# Patient Record
Sex: FEMALE | Race: ASIAN | Employment: OTHER | URBAN - METROPOLITAN AREA
[De-identification: names, ages, dates, MRNs, and addresses within clinical notes are randomized per-mention and may not be internally consistent; named-entity substitution may affect disease eponyms.]

---

## 2017-06-30 ENCOUNTER — OFFICE VISIT (OUTPATIENT)
Dept: CARDIOLOGY CLINIC | Age: 67
End: 2017-06-30

## 2017-06-30 VITALS
SYSTOLIC BLOOD PRESSURE: 132 MMHG | WEIGHT: 151 LBS | DIASTOLIC BLOOD PRESSURE: 72 MMHG | HEART RATE: 75 BPM | HEIGHT: 65 IN | BODY MASS INDEX: 25.16 KG/M2

## 2017-06-30 DIAGNOSIS — I10 ESSENTIAL HYPERTENSION, BENIGN: Primary | ICD-10-CM

## 2017-06-30 DIAGNOSIS — R60.9 EDEMA, UNSPECIFIED TYPE: ICD-10-CM

## 2017-06-30 DIAGNOSIS — E78.00 PURE HYPERCHOLESTEROLEMIA: ICD-10-CM

## 2017-06-30 DIAGNOSIS — I10 ESSENTIAL HYPERTENSION: Chronic | ICD-10-CM

## 2017-06-30 NOTE — LETTER
Mary Salinas 1950 
 
6/30/2017 Dear Daniela Parr MD 
 
I had the pleasure of evaluating  Ms. Nara Kerns in office today. Below are the relevant portions of my assessment and plan of care. ICD-10-CM ICD-9-CM 1. Essential hypertension, benign I10 401.1 AMB POC EKG ROUTINE W/ 12 LEADS, INTER & REP  
   METABOLIC PANEL, BASIC  
   CBC W/O DIFF 2. Essential hypertension I10 401.9 3. Pure hypercholesterolemia E78.00 272.0 LIPID PANEL  
   HEPATIC FUNCTION PANEL  
 5/16 noncompliance; d/adama due to fear of DM 4. Edema, unspecified type R60.9 782.3 DUPLEX LOWER EXT VENOUS BILAT Current Outpatient Prescriptions Medication Sig Dispense Refill  valsartan (DIOVAN) 160 mg tablet Take 1 Tab by mouth every evening. Indications: Hypertension 90 Tab 1  carvedilol (COREG) 6.25 mg tablet TAKE ONE TABLET BY MOUTH TWICE DAILY WITH  MEALS 180 Tab 6  
 omeprazole (PRILOSEC) 20 mg capsule Take 20 mg by mouth two (2) times a day. Indications: GASTROESOPHAGEAL REFLUX  cholecalciferol, VITAMIN D3, (VITAMIN D3) 5,000 unit tab tablet Take 5,000 Units by mouth daily.  atorvastatin (LIPITOR) 10 mg tablet Take 1 Tab by mouth daily. 90 Tab 3  
 amLODIPine (NORVASC) 5 mg tablet Take 1 Tab by mouth daily. (Patient taking differently: Take 5 mg by mouth daily. Indications: HYPERTENSION) 90 Tab 1  
 nitroglycerin (NITROSTAT) 0.4 mg SL tablet 1 Tab by SubLINGual route every five (5) minutes as needed for Chest Pain for up to 3 doses. 25 Tab 0  
 aspirin 81 mg tablet Take 81 mg by mouth daily. Orders Placed This Encounter  DUPLEX LOWER EXT VENOUS BILAT Standing Status:   Future Standing Expiration Date:   7/30/2018  METABOLIC PANEL, BASIC Standing Status:   Future Standing Expiration Date:   9/30/2017  LIPID PANEL Standing Status:   Future Standing Expiration Date:   9/30/2017  
 HEPATIC FUNCTION PANEL Standing Status:   Future Standing Expiration Date:   9/30/2017  CBC W/O DIFF Standing Status:   Future Standing Expiration Date:   9/30/2017  AMB POC EKG ROUTINE W/ 12 LEADS, INTER & REP Order Specific Question:   Reason for Exam: Answer:   follow up If you have questions, please do not hesitate to call me. I look forward to following Ms. Patricia Mckenzie along with you. Sincerely, Sushma Xavier MD

## 2017-06-30 NOTE — PROGRESS NOTES
HISTORY OF PRESENT ILLNESS  Bob Kelly is a 61 y.o. female. HPI Comments: 6/17 edema left leg for 2 months after [place] trip    Cholesterol Problem   The history is provided by the medical records. This is a chronic problem. Associated symptoms include shortness of breath. Pertinent negatives include no chest pain and no headaches. Hypertension   The history is provided by the medical records and patient. This is a chronic problem. The problem has been gradually improving. Associated symptoms include shortness of breath. Pertinent negatives include no chest pain and no headaches. Shortness of Breath   The history is provided by the patient. This is a new problem. The problem occurs intermittently. The current episode started more than 1 week ago. Associated symptoms include leg swelling. Pertinent negatives include no fever, no headaches, no cough, no wheezing, no PND, no orthopnea, no chest pain, no vomiting, no rash and no claudication. The problem's precipitants include exercise (1 flight). Leg Swelling   The history is provided by the patient. This is a new problem. The current episode started more than 1 week ago (4/17). The problem occurs daily. The problem has been gradually worsening. Associated symptoms include shortness of breath. Pertinent negatives include no chest pain and no headaches. The symptoms are aggravated by standing. The symptoms are relieved by sleep (not much change). Review of Systems   Constitutional: Negative for chills, fever, malaise/fatigue and weight loss. HENT: Negative for nosebleeds. Eyes: Negative for discharge. Respiratory: Positive for shortness of breath. Negative for cough and wheezing. Cardiovascular: Positive for leg swelling. Negative for chest pain, palpitations, orthopnea, claudication and PND. Gastrointestinal: Negative for diarrhea, nausea and vomiting. Genitourinary: Negative for dysuria and hematuria.    Musculoskeletal: Negative for joint pain.   Skin: Negative for rash. Neurological: Negative for dizziness, seizures, loss of consciousness and headaches. Endo/Heme/Allergies: Negative for polydipsia. Does not bruise/bleed easily. Psychiatric/Behavioral: Negative for depression and substance abuse. The patient does not have insomnia. Allergies   Allergen Reactions    Phenergan [Promethazine] Other (comments)     Lethargy, slurred speech, hallucinations. Past Medical History:   Diagnosis Date    Arthritis     Chronic pain     right knee    Coronary atherosclerosis of native coronary artery 3/31/2015    possible cad abnormal stress test and ant t inversion in past-2007 asymptomatic     Essential hypertension, benign     GERD (gastroesophageal reflux disease)     Nausea & vomiting     Nonspecific abnormal electrocardiogram (ECG) (EKG) 3/31/2015    ant t inversion old     Other and unspecified hyperlipidemia     Transfusion history     No history of receiving blood or blood product transfusion(s). Family History   Problem Relation Age of Onset    Heart Attack Father      cause of death was MI       Social History   Substance Use Topics    Smoking status: Never Smoker    Smokeless tobacco: Never Used    Alcohol use No      Comment: does not give any significant history of alcohol usage        Current Outpatient Prescriptions   Medication Sig    valsartan (DIOVAN) 160 mg tablet Take 1 Tab by mouth every evening. Indications: Hypertension    carvedilol (COREG) 6.25 mg tablet TAKE ONE TABLET BY MOUTH TWICE DAILY WITH  MEALS    omeprazole (PRILOSEC) 20 mg capsule Take 20 mg by mouth two (2) times a day. Indications: GASTROESOPHAGEAL REFLUX    cholecalciferol, VITAMIN D3, (VITAMIN D3) 5,000 unit tab tablet Take 5,000 Units by mouth daily.  atorvastatin (LIPITOR) 10 mg tablet Take 1 Tab by mouth daily.  amLODIPine (NORVASC) 5 mg tablet Take 1 Tab by mouth daily. (Patient taking differently: Take 5 mg by mouth daily. Indications: HYPERTENSION)    nitroglycerin (NITROSTAT) 0.4 mg SL tablet 1 Tab by SubLINGual route every five (5) minutes as needed for Chest Pain for up to 3 doses.  aspirin 81 mg tablet Take 81 mg by mouth daily. No current facility-administered medications for this visit. Past Surgical History:   Procedure Laterality Date    HX GYN      polyp removal       Diagnostic Studies:  CARDIOLOGY STUDIES 6/13/2016 5/9/2016 3/22/2007   EKG Result - SR, NSTT a/s leads -   Exercise Nuclear Stress Test Result - - ant apical ischemia, nl ef   Pharmacological Nuclear Stress Test Result +CP, no sig EKG changes, nl scan, nl EF - -       Visit Vitals    /72    Pulse 75    Ht 5' 5\" (1.651 m)    Wt 68.5 kg (151 lb)    BMI 25.13 kg/m2       Ms. Angel Rincon has a reminder for a \"due or due soon\" health maintenance. I have asked that she contact her primary care provider for follow-up on this health maintenance. Physical Exam   Constitutional: She is oriented to person, place, and time. She appears well-developed and well-nourished. No distress. HENT:   Head: Normocephalic and atraumatic. Mouth/Throat: Normal dentition. Eyes: Right eye exhibits no discharge. Left eye exhibits no discharge. No scleral icterus. Neck: Neck supple. No JVD present. Carotid bruit is not present. No thyromegaly present. Cardiovascular: Normal rate, regular rhythm, S1 normal, S2 normal, normal heart sounds and intact distal pulses. Exam reveals no gallop and no friction rub. No murmur heard. Pulmonary/Chest: Effort normal and breath sounds normal. She has no wheezes. She has no rales. Abdominal: Soft. She exhibits no mass. There is no tenderness. Musculoskeletal: She exhibits edema (2+). Lymphadenopathy:        Right cervical: No superficial cervical adenopathy present. Left cervical: No superficial cervical adenopathy present. Neurological: She is alert and oriented to person, place, and time.    Skin: Skin is warm and dry. No rash noted. Psychiatric: She has a normal mood and affect. Her behavior is normal.       ASSESSMENT and Helen Woodall was seen today for cholesterol problem. Diagnoses and all orders for this visit:    Essential hypertension, benign  -     AMB POC EKG ROUTINE W/ 12 LEADS, INTER & REP  -     METABOLIC PANEL, BASIC; Future  -     CBC W/O DIFF; Future    Essential hypertension    Pure hypercholesterolemia  Comments:  5/16 noncompliance; d/adama due to fear of DM  Orders:  -     LIPID PANEL; Future  -     HEPATIC FUNCTION PANEL; Future    Edema, unspecified type  -     DUPLEX LOWER EXT VENOUS BILAT; Future        Pertinent laboratory and test data reviewed and discussed with patient.   See patient instructions also for other medical advice given    Medications Discontinued During This Encounter   Medication Reason    ondansetron (ZOFRAN ODT) 8 mg disintegrating tablet Therapy Completed    oxyCODONE IR (ROXICODONE) 5 mg immediate release tablet Therapy Completed    enoxaparin (LOVENOX) 40 mg/0.4 mL Therapy Completed       Follow-up Disposition:  Return in about 2 weeks (around 7/14/2017), or if symptoms worsen or fail to improve, for post test.

## 2017-06-30 NOTE — PROGRESS NOTES
1. Have you been to the ER, urgent care clinic since your last visit? Hospitalized since your last visit? Yes When: 9/2017[facility] Reason for visit: knee replacement    2. Have you seen or consulted any other health care providers outside of the [facility] since your last visit? Include any pap smears or colon screening. No     3. Since your last visit, have you had any of the following symptoms? shortness of breath. 4.  Have you had any blood work, X-rays or cardiac testing?no      No       5. Where do you normally have your labs drawn? 6. Do you need any refills today?    yes

## 2017-06-30 NOTE — MR AVS SNAPSHOT
Visit Information Date & Time Provider Department Dept. Phone Encounter #  
 6/30/2017  3:00 PM Ronnie Kaur MD [facility] [phone] Follow-up Instructions Return in about 2 weeks (around 7/14/2017), or if symptoms worsen or fail to improve, for post test.  
  
Your Appointments 7/10/2017 10:00 AM  
ESTABLISHED PATIENT with Ronnie Kaur MD  
[facility] ([facility]) Appt Note: 2 week post Venous Doppler/[address] Counts include 234 beds at the [facility] [address]  
  
   
 [address] [place] [id number] Upcoming Health Maintenance Date Due Hepatitis C Screening 1950 DTaP/Tdap/Td series (1 - Tdap) 7/3/1973 PAP AKA CERVICAL CYTOLOGY 7/3/1973 BREAST CANCER SCRN MAMMOGRAM 7/3/2002 FOBT Q 1 YEAR AGE 50-75 7/3/2002 ZOSTER VACCINE AGE 60> 7/3/2012 INFLUENZA AGE 9 TO ADULT 8/1/2017 Allergies as of 6/30/2017  Review Complete On: 6/30/2017 By: Ronnie Kaur MD  
  
 Severity Noted Reaction Type Reactions Phenergan [Promethazine] High 09/24/2016   Systemic Other (comments) Lethargy, slurred speech, hallucinations. Current Immunizations  Never Reviewed No immunizations on file. Not reviewed this visit You Were Diagnosed With   
  
 Codes Comments Essential hypertension, benign    -  Primary ICD-10-CM: I10 
ICD-9-CM: 401.1 Essential hypertension     ICD-10-CM: I10 
ICD-9-CM: 401.9 Pure hypercholesterolemia     ICD-10-CM: E78.00 ICD-9-CM: 272.0 5/16 noncompliance; d/adama due to fear of DM Edema, unspecified type     ICD-10-CM: R60.9 ICD-9-CM: 342. 3 Vitals BP Pulse Height(growth percentile) Weight(growth percentile) BMI OB Status 132/72 75 5' 5\" (1.651 m) 151 lb (68.5 kg) 25.13 kg/m2 Postmenopausal  
 Smoking Status Never Smoker Vitals History BMI and BSA Data Body Mass Index Body Surface Area  
 25.13 kg/m 2 1.77 m 2 Preferred Pharmacy Pharmacy Name Phone [facility] [address] [place], [place] [phone] Your Updated Medication List  
  
   
This list is accurate as of: 6/30/17  3:29 PM.  Always use your most recent med list. amLODIPine 5 mg tablet Commonly known as:  Mosetta Hark Take 1 Tab by mouth daily. aspirin 81 mg tablet Take 81 mg by mouth daily. atorvastatin 10 mg tablet Commonly known as:  LIPITOR Take 1 Tab by mouth daily. carvedilol 6.25 mg tablet Commonly known as:  COREG  
TAKE ONE TABLET BY MOUTH TWICE DAILY WITH  MEALS  
  
 cholecalciferol (VITAMIN D3) 5,000 unit Tab tablet Commonly known as:  VITAMIN D3 Take 5,000 Units by mouth daily. nitroglycerin 0.4 mg SL tablet Commonly known as:  NITROSTAT  
1 Tab by SubLINGual route every five (5) minutes as needed for Chest Pain for up to 3 doses. omeprazole 20 mg capsule Commonly known as:  PRILOSEC Take 20 mg by mouth two (2) times a day. Indications: GASTROESOPHAGEAL REFLUX  
  
 valsartan 160 mg tablet Commonly known as:  DIOVAN Take 1 Tab by mouth every evening. Indications: Hypertension We Performed the Following AMB POC EKG ROUTINE W/ 12 LEADS, INTER & REP [45027 CPT(R)] Follow-up Instructions Return in about 2 weeks (around 7/14/2017), or if symptoms worsen or fail to improve, for post test.  
  
To-Do List   
 Around 06/30/2017 Lab:  CBC W/O DIFF Around 06/30/2017 Lab:  HEPATIC FUNCTION PANEL Around 06/30/2017 Lab:  LIPID PANEL Around 06/30/2017 Lab:  METABOLIC PANEL, BASIC   
  
 07/01/2017 Imaging:  DUPLEX LOWER EXT VENOUS BILAT   
  
 07/03/2017 9:00 AM  
  Appointment with THE [facility] at THE [facility] [address] [place] [address] ([phone]) Please have patient bring a copy of their order if same day add-on.   It can also be faxed to [facility] - [phone]. There are no restrictions for this study. The patient can eat breakfast and take their medications. Patient Instructions Medications Discontinued During This Encounter Medication Reason  ondansetron (ZOFRAN ODT) 8 mg disintegrating tablet Therapy Completed  oxyCODONE IR (ROXICODONE) 5 mg immediate release tablet Therapy Completed  enoxaparin (LOVENOX) 40 mg/0.4 mL Therapy Completed Orders Placed This Encounter  DUPLEX LOWER EXT VENOUS BILAT Standing Status:   Future Standing Expiration Date:   7/30/2018  METABOLIC PANEL, BASIC Standing Status:   Future Standing Expiration Date:   9/30/2017  LIPID PANEL Standing Status:   Future Standing Expiration Date:   9/30/2017  
 HEPATIC FUNCTION PANEL Standing Status:   Future Standing Expiration Date:   9/30/2017  CBC W/O DIFF Standing Status:   Future Standing Expiration Date:   9/30/2017  AMB POC EKG ROUTINE W/ 12 LEADS, INTER & REP Order Specific Question:   Reason for Exam: Answer:   follow up High Blood Pressure: Care Instructions Your Care Instructions If your blood pressure is usually above 140/90, you have high blood pressure, or hypertension. That means the top number is 140 or higher or the bottom number is 90 or higher, or both. Despite what a lot of people think, high blood pressure usually doesn't cause headaches or make you feel dizzy or lightheaded. It usually has no symptoms. But it does increase your risk for heart attack, stroke, and kidney or eye damage. The higher your blood pressure, the more your risk increases. Your doctor will give you a goal for your blood pressure. Your goal will be based on your health and your age. An example of a goal is to keep your blood pressure below 140/90.  
Lifestyle changes, such as eating healthy and being active, are always important to help lower blood pressure. You might also take medicine to reach your blood pressure goal. 
Follow-up care is a key part of your treatment and safety. Be sure to make and go to all appointments, and call your doctor if you are having problems. It's also a good idea to know your test results and keep a list of the medicines you take. How can you care for yourself at home? Medical treatment · If you stop taking your medicine, your blood pressure will go back up. You may take one or more types of medicine to lower your blood pressure. Be safe with medicines. Take your medicine exactly as prescribed. Call your doctor if you think you are having a problem with your medicine. · Talk to your doctor before you start taking aspirin every day. Aspirin can help certain people lower their risk of a heart attack or stroke. But taking aspirin isn't right for everyone, because it can cause serious bleeding. · See your doctor regularly. You may need to see the doctor more often at first or until your blood pressure comes down. · If you are taking blood pressure medicine, talk to your doctor before you take decongestants or anti-inflammatory medicine, such as ibuprofen. Some of these medicines can raise blood pressure. · Learn how to check your blood pressure at home. Lifestyle changes · Stay at a healthy weight. This is especially important if you put on weight around the waist. Losing even 10 pounds can help you lower your blood pressure. · If your doctor recommends it, get more exercise. Walking is a good choice. Bit by bit, increase the amount you walk every day. Try for at least 30 minutes on most days of the week. You also may want to swim, bike, or do other activities. · Avoid or limit alcohol. Talk to your doctor about whether you can drink any alcohol. · Try to limit how much sodium you eat to less than 2,300 milligrams (mg) a day. Your doctor may ask you to try to eat less than 1,500 mg a day. · Eat plenty of fruits (such as bananas and oranges), vegetables, legumes, whole grains, and low-fat dairy products. · Lower the amount of saturated fat in your diet. Saturated fat is found in animal products such as milk, cheese, and meat. Limiting these foods may help you lose weight and also lower your risk for heart disease. · Do not smoke. Smoking increases your risk for heart attack and stroke. If you need help quitting, talk to your doctor about stop-smoking programs and medicines. These can increase your chances of quitting for good. When should you call for help? Call 911 anytime you think you may need emergency care. This may mean having symptoms that suggest that your blood pressure is causing a serious heart or blood vessel problem. Your blood pressure may be over 180/110. For example, call 911 if: 
· You have symptoms of a heart attack. These may include: ¨ Chest pain or pressure, or a strange feeling in the chest. 
¨ Sweating. ¨ Shortness of breath. ¨ Nausea or vomiting. ¨ Pain, pressure, or a strange feeling in the back, neck, jaw, or upper belly or in one or both shoulders or arms. ¨ Lightheadedness or sudden weakness. ¨ A fast or irregular heartbeat. · You have symptoms of a stroke. These may include: 
¨ Sudden numbness, tingling, weakness, or loss of movement in your face, arm, or leg, especially on only one side of your body. ¨ Sudden vision changes. ¨ Sudden trouble speaking. ¨ Sudden confusion or trouble understanding simple statements. ¨ Sudden problems with walking or balance. ¨ A sudden, severe headache that is different from past headaches. · You have severe back or belly pain. Do not wait until your blood pressure comes down on its own. Get help right away. Call your doctor now or seek immediate care if: 
· Your blood pressure is much higher than normal (such as 180/110 or higher), but you don't have symptoms. · You think high blood pressure is causing symptoms, such as: ¨ Severe headache. ¨ Blurry vision. Watch closely for changes in your health, and be sure to contact your doctor if: 
· Your blood pressure measures 140/90 or higher at least 2 times. That means the top number is 140 or higher or the bottom number is 90 or higher, or both. · You think you may be having side effects from your blood pressure medicine. · Your blood pressure is usually normal, but it goes above normal at least 2 times. Where can you learn more? Go to http://nayan-ace.info/. Enter Z365 in the search box to learn more about \"High Blood Pressure: Care Instructions. \" Current as of: August 8, 2016 Content Version: 11.3 © 0671-5984 [employer]. Care instructions adapted under license by "[employer] (which disclaims liability or warranty for this information). If you have questions about a medical condition or this instruction, always ask your healthcare professional. [place] [address] any warranty or liability for your use of this information. [facility] 7[facility]3/17 @ 8:30. Introducing [facility]! [employer] introduces [employer] patient portal. Now you can access parts of your medical record, email your doctor's office, and request medication refills online. 1. In your internet browser, go to [email or web address] 2. Click on the First Time User? Click Here link in the Sign In box. You will see the New Member Sign Up page. 3. Enter your V[employer]a Access Code exactly as it appears below. You will not need to use this code after youve completed the sign-up process. If you do not sign up before the expiration date, you must request a new code. · [employer] Access Code: [email or web address] Expires: 9/28/2017  2:30 PM 
 
4.  Enter the last four digits of your Social Security Number (xxxx) and Date of Birth (mm/dd/yyyy) as indicated and click Submit. You will be taken to the next sign-up page. 5. Create a Waynaut ID. This will be your [employer] login ID and cannot be changed, so think of one that is secure and easy to remember. 6. Create a Wayn[employer] password. You can change your password at any time. 7. Enter your Password Reset Question and Answer. This can be used at a later time if you forget your password. 8. Enter your e-mail address. You will receive e-mail notification when new information is available in [address] 9. Click Sign Up. You can now view and download portions of your medical record. 10. Click the Download Summary menu link to download a portable copy of your medical information. If you have questions, please visit the Frequently Asked Questions section of the Wayn[employer] website. Remember, Waynaut is NOT to be used for urgent needs. For medical emergencies, dial 911. Now available from your iPhone and Android! Please provide this summary of care documentation to your next provider. Your primary care clinician is listed as [facility] SHAHANA Banuelos. If you have any questions after today's visit, please call [phone].

## 2017-06-30 NOTE — PATIENT INSTRUCTIONS
Medications Discontinued During This Encounter   Medication Reason    ondansetron (ZOFRAN ODT) 8 mg disintegrating tablet Therapy Completed    oxyCODONE IR (ROXICODONE) 5 mg immediate release tablet Therapy Completed    enoxaparin (LOVENOX) 40 mg/0.4 mL Therapy Completed       Orders Placed This Encounter    DUPLEX LOWER EXT VENOUS BILAT     Standing Status:   Future     Standing Expiration Date:   9/64/3980    METABOLIC PANEL, BASIC     Standing Status:   Future     Standing Expiration Date:   9/30/2017    LIPID PANEL     Standing Status:   Future     Standing Expiration Date:   9/30/2017    HEPATIC FUNCTION PANEL     Standing Status:   Future     Standing Expiration Date:   9/30/2017    CBC W/O DIFF     Standing Status:   Future     Standing Expiration Date:   9/30/2017    AMB POC EKG ROUTINE W/ 12 LEADS, INTER & REP     Order Specific Question:   Reason for Exam:     Answer:   follow up          High Blood Pressure: Care Instructions  Your Care Instructions  If your blood pressure is usually above 140/90, you have high blood pressure, or hypertension. That means the top number is 140 or higher or the bottom number is 90 or higher, or both. Despite what a lot of people think, high blood pressure usually doesn't cause headaches or make you feel dizzy or lightheaded. It usually has no symptoms. But it does increase your risk for heart attack, stroke, and kidney or eye damage. The higher your blood pressure, the more your risk increases. Your doctor will give you a goal for your blood pressure. Your goal will be based on your health and your age. An example of a goal is to keep your blood pressure below 140/90. Lifestyle changes, such as eating healthy and being active, are always important to help lower blood pressure. You might also take medicine to reach your blood pressure goal.  Follow-up care is a key part of your treatment and safety.  Be sure to make and go to all appointments, and call your doctor if you are having problems. It's also a good idea to know your test results and keep a list of the medicines you take. How can you care for yourself at home? Medical treatment  · If you stop taking your medicine, your blood pressure will go back up. You may take one or more types of medicine to lower your blood pressure. Be safe with medicines. Take your medicine exactly as prescribed. Call your doctor if you think you are having a problem with your medicine. · Talk to your doctor before you start taking aspirin every day. Aspirin can help certain people lower their risk of a heart attack or stroke. But taking aspirin isn't right for everyone, because it can cause serious bleeding. · See your doctor regularly. You may need to see the doctor more often at first or until your blood pressure comes down. · If you are taking blood pressure medicine, talk to your doctor before you take decongestants or anti-inflammatory medicine, such as ibuprofen. Some of these medicines can raise blood pressure. · Learn how to check your blood pressure at home. Lifestyle changes  · Stay at a healthy weight. This is especially important if you put on weight around the waist. Losing even 10 pounds can help you lower your blood pressure. · If your doctor recommends it, get more exercise. Walking is a good choice. Bit by bit, increase the amount you walk every day. Try for at least 30 minutes on most days of the week. You also may want to swim, bike, or do other activities. · Avoid or limit alcohol. Talk to your doctor about whether you can drink any alcohol. · Try to limit how much sodium you eat to less than 2,300 milligrams (mg) a day. Your doctor may ask you to try to eat less than 1,500 mg a day. · Eat plenty of fruits (such as bananas and oranges), vegetables, legumes, whole grains, and low-fat dairy products. · Lower the amount of saturated fat in your diet.  Saturated fat is found in animal products such as milk, cheese, and meat. Limiting these foods may help you lose weight and also lower your risk for heart disease. · Do not smoke. Smoking increases your risk for heart attack and stroke. If you need help quitting, talk to your doctor about stop-smoking programs and medicines. These can increase your chances of quitting for good. When should you call for help? Call 911 anytime you think you may need emergency care. This may mean having symptoms that suggest that your blood pressure is causing a serious heart or blood vessel problem. Your blood pressure may be over 180/110. For example, call 911 if:  · You have symptoms of a heart attack. These may include:  ¨ Chest pain or pressure, or a strange feeling in the chest.  ¨ Sweating. ¨ Shortness of breath. ¨ Nausea or vomiting. ¨ Pain, pressure, or a strange feeling in the back, neck, jaw, or upper belly or in one or both shoulders or arms. ¨ Lightheadedness or sudden weakness. ¨ A fast or irregular heartbeat. · You have symptoms of a stroke. These may include:  ¨ Sudden numbness, tingling, weakness, or loss of movement in your face, arm, or leg, especially on only one side of your body. ¨ Sudden vision changes. ¨ Sudden trouble speaking. ¨ Sudden confusion or trouble understanding simple statements. ¨ Sudden problems with walking or balance. ¨ A sudden, severe headache that is different from past headaches. · You have severe back or belly pain. Do not wait until your blood pressure comes down on its own. Get help right away. Call your doctor now or seek immediate care if:  · Your blood pressure is much higher than normal (such as 180/110 or higher), but you don't have symptoms. · You think high blood pressure is causing symptoms, such as:  ¨ Severe headache. ¨ Blurry vision. Watch closely for changes in your health, and be sure to contact your doctor if:  · Your blood pressure measures 140/90 or higher at least 2 times.  That means the top number is 140 or higher or the bottom number is 90 or higher, or both. · You think you may be having side effects from your blood pressure medicine. · Your blood pressure is usually normal, but it goes above normal at least 2 times. Where can you learn more? Go to http://nayan-ace.info/. Enter Q902 in the search box to learn more about \"High Blood Pressure: Care Instructions. \"  Current as of: August 8, 2016  Content Version: 11.3  © 1700-0022 [employer]. Care instructions adapted under license by [employer] (which disclaims liability or warranty for this information). If you have questions about a medical condition or this instruction, always ask your healthcare professional. [place] [address] any warranty or liability for your use of this information. Patient is scheduled to have a Venous Duplex Lower Bilat at [facility] on 7/3/17 @ 8:30.

## 2017-07-03 ENCOUNTER — HOSPITAL ENCOUNTER (OUTPATIENT)
Dept: LAB | Age: 67
Discharge: HOME | End: 2017-07-03
Payer: MEDICARE

## 2017-07-03 ENCOUNTER — TELEPHONE (OUTPATIENT)
Dept: CARDIOLOGY CLINIC | Age: 67
End: 2017-07-03

## 2017-07-03 ENCOUNTER — HOSPITAL ENCOUNTER (OUTPATIENT)
Dept: VASCULAR SURGERY | Age: 67
Discharge: HOME | End: 2017-07-03
Attending: INTERNAL MEDICINE
Payer: MEDICARE

## 2017-07-03 DIAGNOSIS — R60.9 EDEMA, UNSPECIFIED TYPE: ICD-10-CM

## 2017-07-03 LAB
ALBUMIN SERPL BCP-MCNC: 4 G/DL (ref 3.4–5)
ALBUMIN/GLOB SERPL: 1.1 {RATIO} (ref 0.8–1.7)
ALP SERPL-CCNC: 71 U/L (ref 45–117)
ALT SERPL-CCNC: 21 U/L (ref 13–56)
ANION GAP BLD CALC-SCNC: 7 MMOL/L (ref 3–18)
AST SERPL W P-5'-P-CCNC: 22 U/L (ref 15–37)
BASOPHILS # BLD AUTO: 0 K/UL (ref 0–0.06)
BASOPHILS # BLD: 0 % (ref 0–2)
BILIRUB DIRECT SERPL-MCNC: 0.2 MG/DL (ref 0–0.2)
BILIRUB SERPL-MCNC: 0.5 MG/DL (ref 0.2–1)
BUN SERPL-MCNC: 15 MG/DL (ref 7–18)
BUN/CREAT SERPL: 13 (ref 12–20)
CALCIUM SERPL-MCNC: 9.4 MG/DL (ref 8.5–10.1)
CHLORIDE SERPL-SCNC: 105 MMOL/L (ref 100–108)
CHOLEST SERPL-MCNC: 191 MG/DL
CO2 SERPL-SCNC: 30 MMOL/L (ref 21–32)
CREAT SERPL-MCNC: 1.15 MG/DL (ref 0.6–1.3)
DIFFERENTIAL METHOD BLD: ABNORMAL
EOSINOPHIL # BLD: 0.2 K/UL (ref 0–0.4)
EOSINOPHIL NFR BLD: 3 % (ref 0–5)
ERYTHROCYTE [DISTWIDTH] IN BLOOD BY AUTOMATED COUNT: 13.1 % (ref 11.6–14.5)
GLOBULIN SER CALC-MCNC: 3.6 G/DL (ref 2–4)
GLUCOSE SERPL-MCNC: 104 MG/DL (ref 74–99)
HCT VFR BLD AUTO: 34 % (ref 35–45)
HDLC SERPL-MCNC: 78 MG/DL (ref 40–60)
HDLC SERPL: 2.4 {RATIO} (ref 0–5)
HGB BLD-MCNC: 11.2 G/DL (ref 12–16)
LDLC SERPL CALC-MCNC: 93.8 MG/DL (ref 0–100)
LIPID PROFILE,FLP: ABNORMAL
LYMPHOCYTES # BLD AUTO: 29 % (ref 21–52)
LYMPHOCYTES # BLD: 1.9 K/UL (ref 0.9–3.6)
MCH RBC QN AUTO: 27.8 PG (ref 24–34)
MCHC RBC AUTO-ENTMCNC: 32.9 G/DL (ref 31–37)
MCV RBC AUTO: 84.4 FL (ref 74–97)
MONOCYTES # BLD: 0.4 K/UL (ref 0.05–1.2)
MONOCYTES NFR BLD AUTO: 6 % (ref 3–10)
NEUTS SEG # BLD: 4.2 K/UL (ref 1.8–8)
NEUTS SEG NFR BLD AUTO: 62 % (ref 40–73)
PLATELET # BLD AUTO: 288 K/UL (ref 135–420)
PMV BLD AUTO: 10.4 FL (ref 9.2–11.8)
POTASSIUM SERPL-SCNC: 4.9 MMOL/L (ref 3.5–5.5)
PROT SERPL-MCNC: 7.6 G/DL (ref 6.4–8.2)
RBC # BLD AUTO: 4.03 M/UL (ref 4.2–5.3)
SODIUM SERPL-SCNC: 142 MMOL/L (ref 136–145)
TRIGL SERPL-MCNC: 96 MG/DL (ref ?–150)
VLDLC SERPL CALC-MCNC: 19.2 MG/DL
WBC # BLD AUTO: 6.7 K/UL (ref 4.6–13.2)

## 2017-07-03 PROCEDURE — 93970 EXTREMITY STUDY: CPT

## 2017-07-03 NOTE — TELEPHONE ENCOUNTER
Called patient per Dr. Farnaz Ureña regarding DVT studies results. Informed patient that she has no clot in legs to use compression stockings and elevate legs. She voices understanding and acceptance of this advice and will call back if any further questions or concerns.

## 2018-06-29 ENCOUNTER — OFFICE VISIT (OUTPATIENT)
Dept: CARDIOLOGY CLINIC | Age: 68
End: 2018-06-29

## 2018-06-29 VITALS
SYSTOLIC BLOOD PRESSURE: 141 MMHG | HEART RATE: 68 BPM | HEIGHT: 65 IN | WEIGHT: 145 LBS | BODY MASS INDEX: 24.16 KG/M2 | DIASTOLIC BLOOD PRESSURE: 75 MMHG

## 2018-06-29 DIAGNOSIS — E78.00 PURE HYPERCHOLESTEROLEMIA: Primary | ICD-10-CM

## 2018-06-29 DIAGNOSIS — I10 ESSENTIAL HYPERTENSION: Chronic | ICD-10-CM

## 2018-06-29 DIAGNOSIS — R94.31 NONSPECIFIC ABNORMAL ELECTROCARDIOGRAM (ECG) (EKG): ICD-10-CM

## 2018-06-29 RX ORDER — PANTOPRAZOLE SODIUM 40 MG/1
40 TABLET, DELAYED RELEASE ORAL DAILY
COMMUNITY

## 2018-06-29 RX ORDER — AMLODIPINE BESYLATE 10 MG/1
10 TABLET ORAL DAILY
Qty: 90 TAB | Refills: 3 | Status: SHIPPED | OUTPATIENT
Start: 2018-06-29 | End: 2019-06-27 | Stop reason: SDUPTHER

## 2018-06-29 NOTE — MR AVS SNAPSHOT
[address] [facility] 
 
 
 [address] [facility] 
[phone] Patient: Angely Botello MRN: [id number] MRH:[id number][birth year]8/[birth year]43 Visit Information Date & Time Provider Department Dept. Phone Encounter #  
 6/29/2018  8:15 AM Rhett Siddiqui MD [facility]up[facility] cheney [phone] Follow-up Instructions Return in about 1 year (around 6/29/2019), or if symptoms worsen or fail to improve, for get recent labs from PCP, with ekg. Upcoming Health Maintenance Date Due Hepatitis C Screening 1950 DTaP/Tdap/Td series (1 - Tdap) 7/3/1973 BREAST CANCER SCRN MAMMOGRAM 7/3/2002 FOBT Q 1 YEAR AGE 50-75 7/3/2002 ZOSTER VACCINE AGE 60> 5/3/2012 GLAUCOMA SCREENING Q2Y 7/3/2017 Bone Densitometry (Dexa) Screening 7/3/2017 Pneumococcal 65+ Low/Medium Risk (1 of 2 - PCV13) 7/3/2017 MEDICARE YEARLY EXAM 3/15/2018 Influenza Age 5 to Adult 8/1/2018 Allergies as of 6/29/2018  Review Complete On: 6/29/2018 By: Mey Wiggins Severity Noted Reaction Type Reactions Phenergan [Promethazine] High 09/24/2016   Systemic Other (comments) Lethargy, slurred speech, hallucinations. Current Immunizations  Never Reviewed No immunizations on file. Not reviewed this visit You Were Diagnosed With   
  
 Codes Comments Pure hypercholesterolemia    -  Primary ICD-10-CM: E78.00 ICD-9-CM: 272.0 Essential hypertension     ICD-10-CM: I10 
ICD-9-CM: 401.9 Nonspecific abnormal electrocardiogram (ECG) (EKG)     ICD-10-CM: R94.31 
ICD-9-CM: 794.31 Vitals BP Pulse Height(growth percentile) Weight(growth percentile) BMI OB Status 141/75 68 5' 5\" (1.651 m) 145 lb (65.8 kg) 24.13 kg/m2 Postmenopausal  
 Smoking Status Never Smoker Vitals History BMI and BSA Data Body Mass Index Body Surface Area  
 24.13 kg/m 2 1.74 m 2 Preferred Pharmacy Pharmacy Name Phone [facility], [address] [phone] Your Updated Medication List  
  
   
This list is accurate as of 6/29/18  8:35 AM.  Always use your most recent med list. amLODIPine 10 mg tablet Commonly known as:  Delcie Camargo Take 1 Tab by mouth daily. aspirin 81 mg tablet Take 81 mg by mouth daily. atorvastatin 10 mg tablet Commonly known as:  LIPITOR Take 1 Tab by mouth daily. carvedilol 6.25 mg tablet Commonly known as:  COREG  
TAKE ONE TABLET BY MOUTH TWICE DAILY WITH  MEALS  
  
 cholecalciferol (VITAMIN D3) 5,000 unit Tab tablet Commonly known as:  VITAMIN D3 Take 5,000 Units by mouth daily. nitroglycerin 0.4 mg SL tablet Commonly known as:  NITROSTAT  
1 Tab by SubLINGual route every five (5) minutes as needed for Chest Pain for up to 3 doses. pantoprazole 40 mg tablet Commonly known as:  PROTONIX Take 40 mg by mouth daily. valsartan 160 mg tablet Commonly known as:  DIOVAN Take 1 Tab by mouth every evening. Indications: Hypertension Prescriptions Sent to Pharmacy Refills  
 amLODIPine (NORVASC) 10 mg tablet 3 Sig: Take 1 Tab by mouth daily. Class: Normal  
 Pharmacy: [address] [place] [address] [facility] #: [phone] Route: Oral  
  
We Performed the Following AMB POC EKG ROUTINE W/ 12 LEADS, INTER & REP [50716 CPT(R)] AMB POC EKG ROUTINE W/ 12 LEADS, INTER & REP [42152 CPT(R)] Follow-up Instructions Return in about 1 year (around 6/29/2019), or if symptoms worsen or fail to improve, for get recent labs from PCP, with ekg. Patient Instructions After the recommended changes have been made in blood pressure medicines, patient advised to keep BP/HR(pulse rate) chart twice daily and bring us results in next 2 weeks or so. Patient may send the results via \"My Chart\" if desired. Please rest for 5-10 minutes before checking blood pressure Medications Discontinued During This Encounter Medication Reason  omeprazole (PRILOSEC) 20 mg capsule Alternate Therapy High Blood Pressure: Care Instructions Your Care Instructions If your blood pressure is usually above 140/90, you have high blood pressure, or hypertension. That means the top number is 140 or higher or the bottom number is 90 or higher, or both. Despite what a lot of people think, high blood pressure usually doesn't cause headaches or make you feel dizzy or lightheaded. It usually has no symptoms. But it does increase your risk for heart attack, stroke, and kidney or eye damage. The higher your blood pressure, the more your risk increases. Your doctor will give you a goal for your blood pressure. Your goal will be based on your health and your age. An example of a goal is to keep your blood pressure below 140/90. Lifestyle changes, such as eating healthy and being active, are always important to help lower blood pressure. You might also take medicine to reach your blood pressure goal. 
Follow-up care is a key part of your treatment and safety. Be sure to make and go to all appointments, and call your doctor if you are having problems. It's also a good idea to know your test results and keep a list of the medicines you take. How can you care for yourself at home? Medical treatment · If you stop taking your medicine, your blood pressure will go back up. You may take one or more types of medicine to lower your blood pressure. Be safe with medicines. Take your medicine exactly as prescribed. Call your doctor if you think you are having a problem with your medicine. · Talk to your doctor before you start taking aspirin every day. Aspirin can help certain people lower their risk of a heart attack or stroke. But taking aspirin isn't right for everyone, because it can cause serious bleeding. · See your doctor regularly. You may need to see the doctor more often at first or until your blood pressure comes down. · If you are taking blood pressure medicine, talk to your doctor before you take decongestants or anti-inflammatory medicine, such as ibuprofen. Some of these medicines can raise blood pressure. · Learn how to check your blood pressure at home. Lifestyle changes · Stay at a healthy weight. This is especially important if you put on weight around the waist. Losing even 10 pounds can help you lower your blood pressure. · If your doctor recommends it, get more exercise. Walking is a good choice. Bit by bit, increase the amount you walk every day. Try for at least 30 minutes on most days of the week. You also may want to swim, bike, or do other activities. · Avoid or limit alcohol. Talk to your doctor about whether you can drink any alcohol. · Try to limit how much sodium you eat to less than 2,300 milligrams (mg) a day. Your doctor may ask you to try to eat less than 1,500 mg a day. · Eat plenty of fruits (such as bananas and oranges), vegetables, legumes, whole grains, and low-fat dairy products. · Lower the amount of saturated fat in your diet. Saturated fat is found in animal products such as milk, cheese, and meat. Limiting these foods may help you lose weight and also lower your risk for heart disease. · Do not smoke. Smoking increases your risk for heart attack and stroke. If you need help quitting, talk to your doctor about stop-smoking programs and medicines. These can increase your chances of quitting for good. When should you call for help? Call 911 anytime you think you may need emergency care. This may mean having symptoms that suggest that your blood pressure is causing a serious heart or blood vessel problem. Your blood pressure may be over 180/110. ? For example, call 911 if: 
? · You have symptoms of a heart attack. These may include: ¨ Chest pain or pressure, or a strange feeling in the chest. 
¨ Sweating. ¨ Shortness of breath. ¨ Nausea or vomiting. ¨ Pain, pressure, or a strange feeling in the back, neck, jaw, or upper belly or in one or both shoulders or arms. ¨ Lightheadedness or sudden weakness. ¨ A fast or irregular heartbeat. ? · You have symptoms of a stroke. These may include: 
¨ Sudden numbness, tingling, weakness, or loss of movement in your face, arm, or leg, especially on only one side of your body. ¨ Sudden vision changes. ¨ Sudden trouble speaking. ¨ Sudden confusion or trouble understanding simple statements. ¨ Sudden problems with walking or balance. ¨ A sudden, severe headache that is different from past headaches. ? · You have severe back or belly pain. ?Do not wait until your blood pressure comes down on its own. Get help right away. ?Call your doctor now or seek immediate care if: 
? · Your blood pressure is much higher than normal (such as 180/110 or higher), but you don't have symptoms. ? · You think high blood pressure is causing symptoms, such as: ¨ Severe headache. ¨ Blurry vision. ? Watch closely for changes in your health, and be sure to contact your doctor if: 
? · Your blood pressure measures 140/90 or higher at least 2 times. That means the top number is 140 or higher or the bottom number is 90 or higher, or both. ? · You think you may be having side effects from your blood pressure medicine. ? · Your blood pressure is usually normal, but it goes above normal at least 2 times. Where can you learn more? Go to http://nayan-ace.info/. Enter S803 in the search box to learn more about \"High Blood Pressure: Care Instructions. \" Current as of: September 21, 2016 Content Version: 11.4 © 4870-2176 [employer].  Care instructions adapted under license by [employer] (which disclaims liability or warranty for this information). If you have questions about a medical condition or this instruction, always ask your healthcare professional. [address] any warranty or liability for your use of this information. Requested labs from PCP. Introducing \[facility]\"" HEALTH SERVICES! [facility] introduces [facility] patient portal. Now you can access parts of your medical record, email your doctor's office, and request medication refills online. 1. In your internet browser, go to [email or web address]. My e[employer]hoe/[employer] 2. Click on the First Time User? Click Here link in the Sign In box. You will see the New Member Sign Up page. 3. Enter your [facility] Access Code exactly as it appears below. You will not need to use this code after youve completed the sign-up process. If you do not sign up before the expiration date, you must request a new code. · [facility] Access Code: [email or web address] Expires: 9/27/2018  8:35 AM 
 
4. Enter the last four digits of your Social Security Number (xxxx) and Date of Birth (mm/dd/yyyy) as indicated and click Submit. You will be taken to the next sign-up page. 5. Create a [facility] ID. This will be your [facility] login ID and cannot be changed, so think of one that is secure and easy to remember. 6. Create a [facility] password. You can change your password at any time. 7. Enter your Password Reset Question and Answer. This can be used at a later time if you forget your password. 8. Enter your e-mail address. You will receive e-mail notification when new information is available in [address] 9. Click Sign Up. You can now view and download portions of your medical record. 10. Click the Download Summary menu link to download a portable copy of your medical information. If you have questions, please visit the Frequently Asked Questions section of the [facility] website. Remember, [facility] is NOT to be used for urgent needs. For medical emergencies, dial 911. Now available from your iPhone and Android! Please provide this summary of care documentation to your next provider. Your primary care clinician is listed as Katie Ascencio. If you have any questions after today's visit, please call [phone].

## 2018-06-29 NOTE — LETTER
Yoanna Reese 1950 
 
6/29/2018 Dear Delaney Santana MD 
 
I had the pleasure of evaluating  Ms. Brittney Escobedo in office today. Below are the relevant portions of my assessment and plan of care. ICD-10-CM ICD-9-CM 1. Pure hypercholesterolemia E78.00 272.0 AMB POC EKG ROUTINE W/ 12 LEADS, INTER & REP  
   AMB POC EKG ROUTINE W/ 12 LEADS, INTER & REP 2. Essential hypertension I10 401.9 amLODIPine (NORVASC) 10 mg tablet 3. Nonspecific abnormal electrocardiogram (ECG) (EKG) R94.31 794.31 Current Outpatient Prescriptions Medication Sig Dispense Refill  pantoprazole (PROTONIX) 40 mg tablet Take 40 mg by mouth daily.  amLODIPine (NORVASC) 10 mg tablet Take 1 Tab by mouth daily. 90 Tab 3  
 valsartan (DIOVAN) 160 mg tablet Take 1 Tab by mouth every evening. Indications: Hypertension 90 Tab 1  carvedilol (COREG) 6.25 mg tablet TAKE ONE TABLET BY MOUTH TWICE DAILY WITH  MEALS 180 Tab 6  cholecalciferol, VITAMIN D3, (VITAMIN D3) 5,000 unit tab tablet Take 5,000 Units by mouth daily.  atorvastatin (LIPITOR) 10 mg tablet Take 1 Tab by mouth daily. 90 Tab 3  
 nitroglycerin (NITROSTAT) 0.4 mg SL tablet 1 Tab by SubLINGual route every five (5) minutes as needed for Chest Pain for up to 3 doses. 25 Tab 0  
 aspirin 81 mg tablet Take 81 mg by mouth daily. Orders Placed This Encounter  AMB POC EKG ROUTINE W/ 12 LEADS, INTER & REP Order Specific Question:   Reason for Exam: Answer:   1 year  AMB POC EKG ROUTINE W/ 12 LEADS, INTER & REP Order Specific Question:   Reason for Exam: Answer:   htn  pantoprazole (PROTONIX) 40 mg tablet Sig: Take 40 mg by mouth daily.  amLODIPine (NORVASC) 10 mg tablet Sig: Take 1 Tab by mouth daily. Dispense:  90 Tab Refill:  3 If you have questions, please do not hesitate to call me. I look forward to following Ms. Brittney Escobedo along with you. Sincerely, Bard Jessica MD

## 2018-06-29 NOTE — PROGRESS NOTES
1. Have you been to the ER, urgent care clinic since your last visit? Hospitalized since your last visit?     no2. Have you seen or consulted any other health care providers outside of the [address] [place] [address] since your last visit? Include any pap smears or colon screening. Yes Where: pcp     3. Since your last visit, have you had any of the following symptoms? no       4. Have you had any blood work, X-rays or cardiac testing? Yes printed     }    5. Where do you normally have your labs drawn? 6. Do you need any refills today?    no

## 2019-06-27 ENCOUNTER — OFFICE VISIT (OUTPATIENT)
Dept: CARDIOLOGY CLINIC | Age: 69
End: 2019-06-27

## 2019-06-27 VITALS
WEIGHT: 147.8 LBS | SYSTOLIC BLOOD PRESSURE: 116 MMHG | BODY MASS INDEX: 24.62 KG/M2 | DIASTOLIC BLOOD PRESSURE: 65 MMHG | HEIGHT: 65 IN | HEART RATE: 70 BPM

## 2019-06-27 DIAGNOSIS — R94.31 NONSPECIFIC ABNORMAL ELECTROCARDIOGRAM (ECG) (EKG): ICD-10-CM

## 2019-06-27 DIAGNOSIS — E78.00 PURE HYPERCHOLESTEROLEMIA: ICD-10-CM

## 2019-06-27 DIAGNOSIS — I10 ESSENTIAL HYPERTENSION: Primary | ICD-10-CM

## 2019-06-27 RX ORDER — AMLODIPINE BESYLATE 10 MG/1
10 TABLET ORAL DAILY
Qty: 90 TAB | Refills: 3 | Status: SHIPPED | OUTPATIENT
Start: 2019-06-27 | End: 2021-06-07 | Stop reason: ALTCHOICE

## 2019-06-27 RX ORDER — LEVOTHYROXINE SODIUM 25 UG/1
25 TABLET ORAL
Qty: 90 TAB | Refills: 3 | Status: SHIPPED | OUTPATIENT
Start: 2019-06-27 | End: 2022-09-16 | Stop reason: ALTCHOICE

## 2019-06-27 RX ORDER — LEVOTHYROXINE SODIUM 25 UG/1
TABLET ORAL
COMMUNITY
End: 2019-06-27 | Stop reason: SDUPTHER

## 2019-06-27 RX ORDER — VALSARTAN 160 MG/1
160 TABLET ORAL EVERY EVENING
Qty: 90 TAB | Refills: 3 | Status: SHIPPED | OUTPATIENT
Start: 2019-06-27 | End: 2021-07-22 | Stop reason: SDUPTHER

## 2019-06-27 RX ORDER — MAGNESIUM 200 MG
1000 TABLET ORAL DAILY
COMMUNITY

## 2019-06-27 RX ORDER — ATORVASTATIN CALCIUM 10 MG/1
10 TABLET, FILM COATED ORAL DAILY
Qty: 90 TAB | Refills: 3 | Status: SHIPPED | OUTPATIENT
Start: 2019-06-27 | End: 2021-06-07

## 2019-06-27 NOTE — PROGRESS NOTES
HISTORY OF PRESENT ILLNESS  Danny Roberts is a 79 y.o. female. 6/18 edema resolved on its own. Mild dyspnea on exertion persists. No chest pain  6/17 edema left leg for 2 months after [place] trip    Hypertension   The history is provided by the medical records and patient. This is a chronic problem. The problem has been gradually improving. Pertinent negatives include no chest pain, no headaches and no shortness of breath. Cholesterol Problem   The history is provided by the medical records. This is a chronic problem. Pertinent negatives include no chest pain, no headaches and no shortness of breath. Shortness of Breath   The history is provided by the patient. This is a new problem. The problem occurs intermittently. The current episode started more than 1 week ago. The problem has not changed since onset. Pertinent negatives include no fever, no headaches, no cough, no wheezing, no PND, no orthopnea, no chest pain, no vomiting, no rash, no leg swelling and no claudication. The problem's precipitants include exercise (1 flight). Review of Systems   Constitutional: Negative for chills, fever, malaise/fatigue and weight loss. HENT: Negative for nosebleeds. Eyes: Negative for discharge. Respiratory: Negative for cough, shortness of breath and wheezing. Cardiovascular: Negative for chest pain, palpitations, orthopnea, claudication, leg swelling and PND. Gastrointestinal: Negative for diarrhea, nausea and vomiting. Genitourinary: Negative for dysuria and hematuria. Musculoskeletal: Negative for joint pain. Skin: Negative for rash. Neurological: Negative for dizziness, seizures, loss of consciousness and headaches. Endo/Heme/Allergies: Negative for polydipsia. Does not bruise/bleed easily. Psychiatric/Behavioral: Negative for depression and substance abuse. The patient does not have insomnia.       Allergies   Allergen Reactions    Phenergan [Promethazine] Other (comments) Lethargy, slurred speech, hallucinations. Past Medical History:   Diagnosis Date    Arthritis     Chronic pain     right knee    Coronary atherosclerosis of native coronary artery 3/31/2015    possible cad abnormal stress test and ant t inversion in past-2007 asymptomatic     Essential hypertension, benign     GERD (gastroesophageal reflux disease)     Nausea & vomiting     Nonspecific abnormal electrocardiogram (ECG) (EKG) 3/31/2015    ant t inversion old     Other and unspecified hyperlipidemia     Transfusion history     No history of receiving blood or blood product transfusion(s). Family History   Problem Relation Age of Onset    Heart Attack Father         cause of death was MI       Social History     Tobacco Use    Smoking status: Never Smoker    Smokeless tobacco: Never Used   Substance Use Topics    Alcohol use: No     Comment: does not give any significant history of alcohol usage    Drug use: No        Current Outpatient Medications   Medication Sig    cyanocobalamin (VITAMIN B-12) 1,000 mcg sublingual tablet Take 1,000 mcg by mouth daily.  levothyroxine (SYNTHROID) 25 mcg tablet Take  by mouth Daily (before breakfast).  pantoprazole (PROTONIX) 40 mg tablet Take 40 mg by mouth daily.  amLODIPine (NORVASC) 10 mg tablet Take 1 Tab by mouth daily.  valsartan (DIOVAN) 160 mg tablet Take 1 Tab by mouth every evening. Indications: Hypertension    carvedilol (COREG) 6.25 mg tablet TAKE ONE TABLET BY MOUTH TWICE DAILY WITH  MEALS    cholecalciferol, VITAMIN D3, (VITAMIN D3) 5,000 unit tab tablet Take 5,000 Units by mouth daily.  atorvastatin (LIPITOR) 10 mg tablet Take 1 Tab by mouth daily.  nitroglycerin (NITROSTAT) 0.4 mg SL tablet 1 Tab by SubLINGual route every five (5) minutes as needed for Chest Pain for up to 3 doses. No current facility-administered medications for this visit.          Past Surgical History:   Procedure Laterality Date    HX GYN polyp removal       Diagnostic Studies:  CARDIOLOGY STUDIES 6/13/2016 5/9/2016 3/22/2007   EKG Result - SR, NSTT a/s leads -   Exercise Nuclear Stress Test Result - - ant apical ischemia, nl ef   Pharmacological Nuclear Stress Test Result +CP, no sig EKG changes, nl scan, nl EF - -   Some recent data might be hidden       Visit Vitals  /65   Pulse 70   Ht 5' 5\" (1.651 m)   Wt 67 kg (147 lb 12.8 oz)   BMI 24.60 kg/m²       Ms. Burnetta Goltz has a reminder for a \"due or due soon\" health maintenance. I have asked that she contact her primary care provider for follow-up on this health maintenance. Physical Exam   Constitutional: She is oriented to person, place, and time. She appears well-developed and well-nourished. No distress. HENT:   Head: Normocephalic and atraumatic. Mouth/Throat: Normal dentition. Eyes: Right eye exhibits no discharge. Left eye exhibits no discharge. No scleral icterus. Neck: Neck supple. No JVD present. Carotid bruit is not present. No thyromegaly present. Cardiovascular: Normal rate, regular rhythm, S1 normal, S2 normal, normal heart sounds and intact distal pulses. Exam reveals no gallop and no friction rub. No murmur heard. Pulmonary/Chest: Effort normal and breath sounds normal. She has no wheezes. She has no rales. Abdominal: Soft. She exhibits no mass. There is no tenderness. Musculoskeletal: She exhibits no edema. Lymphadenopathy:        Right cervical: No superficial cervical adenopathy present. Left cervical: No superficial cervical adenopathy present. Neurological: She is alert and oriented to person, place, and time. Skin: Skin is warm and dry. No rash noted. Psychiatric: She has a normal mood and affect. Her behavior is normal.       ASSESSMENT and PLAN    Nonspecific ST-T changes: 6/16 nl stress test  abnormal stress test and ant t inversion in past-2007      Dyspnea on exertion is chronic and likely due to deconditioning.   Recommended that patient exercise regularly and report any significant symptoms. Lipids are being followed by PCP. Diagnoses and all orders for this visit:    1. Essential hypertension  -     AMB POC EKG ROUTINE W/ 12 LEADS, INTER & REP    2. Pure hypercholesterolemia    3. Nonspecific abnormal electrocardiogram (ECG) (EKG)        Pertinent laboratory and test data reviewed and discussed with patient. See patient instructions also for other medical advice given    Medications Discontinued During This Encounter   Medication Reason    aspirin 81 mg tablet        Follow-up and Dispositions    · Return in about 1 year (around 6/27/2020), or if symptoms worsen or fail to improve, for with ekg.

## 2019-06-27 NOTE — PROGRESS NOTES
1. Have you been to the ER, urgent care clinic since your last visit? Hospitalized since your last visit?     no    2. Have you seen or consulted any other health care providers outside of the [address] [place] [address] since your last visit? Include any pap smears or colon screening. No     3. Since your last visit, have you had any of the following symptoms? no       4. Have you had any blood work, X-rays or cardiac testing? No       5. Where do you normally have your labs drawn? [facility]  6. Do you need any refills today?    no

## 2019-06-27 NOTE — PATIENT INSTRUCTIONS
Medications Discontinued During This Encounter   Medication Reason    aspirin 81 mg tablet           A Healthy Heart: Care Instructions  Your Care Instructions    Heart disease occurs when a substance called plaque builds up in the vessels that supply oxygen-rich blood to your heart. This can narrow the blood vessels and reduce blood flow. A heart attack happens when blood flow is completely blocked. A high-fat diet, smoking, and other factors increase the risk of heart disease. Your doctor has found that you have a chance of having heart disease. You can do lots of things to keep your heart healthy. It may not be easy, but you can change your diet, exercise more, and quit smoking. These steps really work to lower your chance of heart disease. Follow-up care is a key part of your treatment and safety. Be sure to make and go to all appointments, and call your doctor if you are having problems. It's also a good idea to know your test results and keep a list of the medicines you take. How can you care for yourself at home? Diet    · Use less salt when you cook and eat. This helps lower your blood pressure. Taste food before salting. Add only a little salt when you think you need it. With time, your taste buds will adjust to less salt.     · Eat fewer snack items, fast foods, canned soups, and other high-salt, high-fat, processed foods.     · Read food labels and try to avoid saturated and trans fats. They increase your risk of heart disease by raising cholesterol levels.     · Limit the amount of solid fat-butter, margarine, and shortening-you eat. Use olive, peanut, or canola oil when you cook. Bake, broil, and steam foods instead of frying them.     · Eating fish can lower your risk for heart disease. Eat at least 2 servings of fish a week. Garwood, mackerel, herring, sardines, and chunk light tuna are very good choices.  These fish contain omega-3 fatty acids.     · Eat a variety of fruit and vegetables every day. Dark green, deep orange, red, or yellow fruits and vegetables are especially good for you. Examples include spinach, carrots, peaches, and berries.     · Foods high in fiber can reduce your cholesterol and provide important vitamins and minerals. High-fiber foods include whole-grain cereals and breads, oatmeal, beans, brown rice, citrus fruits, and apples.     · Limit drinks and foods with added sugar. These include candy, desserts, and soda pop.    Lifestyle changes    · If your doctor recommends it, get more exercise. Walking is a good choice. Bit by bit, increase the amount you walk every day. Try for at least 30 minutes on most days of the week. You also may want to swim, bike, or do other activities.     · Do not smoke. If you need help quitting, talk to your doctor about stop-smoking programs and medicines. These can increase your chances of quitting for good. Quitting smoking may be the most important step you can take to protect your heart. It is never too late to quit. You will get health benefits right away.     · Limit alcohol to 2 drinks a day for men and 1 drink a day for women. Too much alcohol can cause health problems. Medicines    · Take your medicines exactly as prescribed. Call your doctor if you think you are having a problem with your medicine.     · If your doctor recommends aspirin, take the amount directed each day. Make sure you take aspirin and not another kind of pain reliever, such as acetaminophen (Tylenol). If you take ibuprofen (such as Advil or Motrin) for other problems, take aspirin at least 2 hours before taking ibuprofen. When should you call for help? Call 911 if you have symptoms of a heart attack.  These may include:    · Chest pain or pressure, or a strange feeling in the chest.     · Sweating.     · Shortness of breath.     · Pain, pressure, or a strange feeling in the back, neck, jaw, or upper belly or in one or both shoulders or arms.     · Lightheadedness or sudden weakness.     · A fast or irregular heartbeat.    After you call 911, the  may tell you to chew 1 adult-strength or 2 to 4 low-dose aspirin. Wait for an ambulance. Do not try to drive yourself.   Watch closely for changes in your health, and be sure to contact your doctor if you have any problems. Where can you learn more? Go to http://nayan-ace.info/. Enter I408 in the search box to learn more about \"A Healthy Heart: Care Instructions. \"  Current as of: 2018  Content Version: 11.9  © 1441-4610 [employer]. Care instructions adapted under license by [employer] (which disclaims liability or warranty for this information). If you have questions about a medical condition or this instruction, always ask your healthcare professional. [address] any warranty or liability for your use of this information. [facility] Activation    Thank you for requesting access to [facility]. Please follow the instructions below to securely access and download your online medical record. [facility] allows you to send messages to your doctor, view your test results, renew your prescriptions, schedule appointments, and more. How Do I Sign Up? 1. In your internet browser, go to [email or web address]. 2. Click on the First Time User? Click Here link in the Sign In box. You will see the New Member Sign Up page. 3. Enter your [facility] Access Code exactly as it appears below. You will not need to use this code after youve completed the sign-up process. If you do not sign up before the expiration date, you must request a new code. [facility] Access Code: [email or web address]  Expires: 2019  8:47 AM (This is the date your [facility] access code will )    4. Enter the last four digits of your Social Security Number (xxxx) and Date of Birth (mm/dd/yyyy) as indicated and click Submit.  You will be taken to the next sign-up page.  5. Create a baimos technologiest ID. This will be your [facility] login ID and cannot be changed, so think of one that is secure and easy to remember. 6. Create a [facility] password. You can change your password at any time. 7. Enter your Password Reset Question and Answer. This can be used at a later time if you forget your password. 8. Enter your e-mail address. You will receive e-mail notification when new information is available in [address]. 9. Click Sign Up. You can now view and download portions of your medical record. 10. Click the Download Summary menu link to download a portable copy of your medical information. Additional Information    If you have questions, please visit the Frequently Asked Questions section of the [employer] website at [email or web address] Remember, [facility] is NOT to be used for urgent needs. For medical emergencies, dial 911.

## 2020-06-12 ENCOUNTER — OFFICE VISIT (OUTPATIENT)
Dept: CARDIOLOGY CLINIC | Age: 70
End: 2020-06-12

## 2020-06-12 VITALS
SYSTOLIC BLOOD PRESSURE: 149 MMHG | HEART RATE: 78 BPM | WEIGHT: 145 LBS | HEIGHT: 65 IN | TEMPERATURE: 98.1 F | DIASTOLIC BLOOD PRESSURE: 92 MMHG | BODY MASS INDEX: 24.16 KG/M2

## 2020-06-12 DIAGNOSIS — R94.31 NONSPECIFIC ABNORMAL ELECTROCARDIOGRAM (ECG) (EKG): ICD-10-CM

## 2020-06-12 DIAGNOSIS — E78.00 PURE HYPERCHOLESTEROLEMIA: ICD-10-CM

## 2020-06-12 DIAGNOSIS — I10 ESSENTIAL HYPERTENSION: Primary | ICD-10-CM

## 2020-06-12 RX ORDER — CARVEDILOL 6.25 MG/1
TABLET ORAL
Qty: 180 TAB | Refills: 3 | Status: SHIPPED | OUTPATIENT
Start: 2020-06-12 | End: 2022-09-16 | Stop reason: ALTCHOICE

## 2020-06-12 NOTE — PROGRESS NOTES
1. Have you been to the ER, urgent care clinic since your last visit? Hospitalized since your last visit?     y3s     2. Have you seen or consulted any other health care providers outside of the [address] [place] [address] since your last visit? Include any pap smears or colon screening. No     3. Since your last visit, have you had any of the following symptoms?      palpitations. 4.  Have you had any blood work, X-rays or cardiac testing? No         5. Where do you normally have your labs drawn? 6. Do you need any refills today?    no

## 2020-06-12 NOTE — PROGRESS NOTES
HISTORY OF PRESENT ILLNESS  Morgan Carbajal is a 81 y.o. female. 6/20 complains of high heart rate in 80s and 90s at times when she feels bad in the chest.  6/18 edema resolved on its own. Mild dyspnea on exertion persists. No chest pain  6/17 edema left leg for 2 months after [place]    Cholesterol Problem   The history is provided by the medical records. This is a chronic problem. Associated symptoms include shortness of breath. Pertinent negatives include no chest pain and no headaches. Hypertension   The history is provided by the medical records and patient. This is a chronic problem. The problem has been gradually improving. Associated symptoms include shortness of breath. Pertinent negatives include no chest pain and no headaches. Shortness of Breath   The history is provided by the patient. This is a new problem. The problem occurs intermittently. The current episode started more than 1 week ago. The problem has not changed since onset. Pertinent negatives include no fever, no headaches, no cough, no wheezing, no PND, no orthopnea, no chest pain, no vomiting, no rash, no leg swelling and no claudication. The problem's precipitants include exercise (1 flight). Review of Systems   Constitutional: Negative for chills, fever, malaise/fatigue and weight loss. HENT: Negative for nosebleeds. Eyes: Negative for discharge. Respiratory: Positive for shortness of breath. Negative for cough and wheezing. Cardiovascular: Negative for chest pain, palpitations, orthopnea, claudication, leg swelling and PND. Gastrointestinal: Negative for diarrhea, nausea and vomiting. Genitourinary: Negative for dysuria and hematuria. Musculoskeletal: Negative for joint pain. Skin: Negative for rash. Neurological: Negative for dizziness, seizures, loss of consciousness and headaches. Endo/Heme/Allergies: Negative for polydipsia. Does not bruise/bleed easily.    Psychiatric/Behavioral: Negative for depression and substance abuse. The patient does not have insomnia. Allergies   Allergen Reactions    Phenergan [Promethazine] Other (comments)     Lethargy, slurred speech, hallucinations. Past Medical History:   Diagnosis Date    Arthritis     Chronic pain     right knee    Coronary atherosclerosis of native coronary artery 3/31/2015    possible cad abnormal stress test and ant t inversion in past-2007 asymptomatic     Essential hypertension, benign     GERD (gastroesophageal reflux disease)     Nausea & vomiting     Nonspecific abnormal electrocardiogram (ECG) (EKG) 3/31/2015    ant t inversion old     Other and unspecified hyperlipidemia     Transfusion history     No history of receiving blood or blood product transfusion(s). Family History   Problem Relation Age of Onset    Heart Attack Father         cause of death was MI       Social History     Tobacco Use    Smoking status: Never Smoker    Smokeless tobacco: Never Used   Substance Use Topics    Alcohol use: No     Comment: does not give any significant history of alcohol usage    Drug use: No        Current Outpatient Medications   Medication Sig    cyanocobalamin (VITAMIN B-12) 1,000 mcg sublingual tablet Take 1,000 mcg by mouth daily.  amLODIPine (NORVASC) 10 mg tablet Take 1 Tab by mouth daily.  atorvastatin (LIPITOR) 10 mg tablet Take 1 Tab by mouth daily.  levothyroxine (SYNTHROID) 25 mcg tablet Take 1 Tab by mouth Daily (before breakfast).  valsartan (DIOVAN) 160 mg tablet Take 1 Tab by mouth every evening. Indications: high blood pressure    pantoprazole (PROTONIX) 40 mg tablet Take 40 mg by mouth daily.  carvedilol (COREG) 6.25 mg tablet TAKE ONE TABLET BY MOUTH TWICE DAILY WITH  MEALS    cholecalciferol, VITAMIN D3, (VITAMIN D3) 5,000 unit tab tablet Take 5,000 Units by mouth daily.     nitroglycerin (NITROSTAT) 0.4 mg SL tablet 1 Tab by SubLINGual route every five (5) minutes as needed for Chest Pain for up to 3 doses. No current facility-administered medications for this visit. Past Surgical History:   Procedure Laterality Date    HX GYN      polyp removal       Diagnostic Studies:  CARDIOLOGY STUDIES 6/13/2016 5/9/2016 3/22/2007   EKG Result - SR, NSTT a/s leads -   Exercise Nuclear Stress Test Result - - ant apical ischemia, nl ef   Pharmacological Nuclear Stress Test Result +CP, no sig EKG changes, nl scan, nl EF - -   Some recent data might be hidden       Visit Vitals  BP (!) 149/92   Pulse 78   Temp 98.1 °F (36.7 °C)   Ht 5' 5\" (1.651 m)   Wt 65.8 kg (145 lb)   BMI 24.13 kg/m²       Ms. Sawyer Valencia has a reminder for a \"due or due soon\" health maintenance. I have asked that she contact her primary care provider for follow-up on this health maintenance. Physical Exam   Constitutional: She is oriented to person, place, and time. She appears well-developed and well-nourished. No distress. HENT:   Head: Normocephalic and atraumatic. Mouth/Throat: Normal dentition. Eyes: Right eye exhibits no discharge. Left eye exhibits no discharge. No scleral icterus. Neck: Neck supple. No JVD present. Carotid bruit is not present. No thyromegaly present. Cardiovascular: Normal rate, regular rhythm, S1 normal, S2 normal, normal heart sounds and intact distal pulses. Exam reveals no gallop and no friction rub. No murmur heard. Pulmonary/Chest: Effort normal and breath sounds normal. She has no wheezes. She has no rales. Abdominal: Soft. She exhibits no mass. There is no abdominal tenderness. Musculoskeletal:         General: No edema. Lymphadenopathy:        Right cervical: No superficial cervical adenopathy present. Left cervical: No superficial cervical adenopathy present. Neurological: She is alert and oriented to person, place, and time. Skin: Skin is warm and dry. No rash noted. Psychiatric: She has a normal mood and affect.  Her behavior is normal.       ASSESSMENT and PLAN    Nonspecific ST-T changes: 6/16 nl stress test  abnormal stress test and ant t inversion in past-2007      Dyspnea on exertion is chronic and likely due to deconditioning. Recommended that patient exercise regularly and report any significant symptoms. Increase carvedilol which will help with heart rate blood pressure. Follow home chart. Labs as ordered. Diagnoses and all orders for this visit:    1. Essential hypertension  -     AMB POC EKG ROUTINE W/ 12 LEADS, INTER & REP  -     carvediloL (COREG) 6.25 mg tablet; TAKE ONE TABLET BY MOUTH TWICE DAILY WITH  MEALS  -     METABOLIC PANEL, BASIC; Future    2. Pure hypercholesterolemia  -     LIPID PANEL; Future  -     HEPATIC FUNCTION PANEL; Future    3. Nonspecific abnormal electrocardiogram (ECG) (EKG)        Pertinent laboratory and test data reviewed and discussed with patient.   See patient instructions also for other medical advice given    Medications Discontinued During This Encounter   Medication Reason    carvedilol (COREG) 6.25 mg tablet        Follow-up and Dispositions    · Return in about 1 year (around 6/12/2021), or if symptoms worsen or fail to improve, for post test.

## 2020-06-12 NOTE — PATIENT INSTRUCTIONS
Medications Discontinued During This Encounter   Medication Reason    carvedilol (COREG) 6.25 mg tablet           A Healthy Heart: Care Instructions  Your Care Instructions     Coronary artery disease, also called heart disease, occurs when a substance called plaque builds up in the vessels that supply oxygen-rich blood to your heart muscle. This can narrow the blood vessels and reduce blood flow. A heart attack happens when blood flow is completely blocked. A high-fat diet, smoking, and other factors increase the risk of heart disease. Your doctor has found that you have a chance of having heart disease. You can do lots of things to keep your heart healthy. It may not be easy, but you can change your diet, exercise more, and quit smoking. These steps really work to lower your chance of heart disease. Follow-up care is a key part of your treatment and safety. Be sure to make and go to all appointments, and call your doctor if you are having problems. It's also a good idea to know your test results and keep a list of the medicines you take. How can you care for yourself at home? Diet  · Use less salt when you cook and eat. This helps lower your blood pressure. Taste food before salting. Add only a little salt when you think you need it. With time, your taste buds will adjust to less salt. · Eat fewer snack items, fast foods, canned soups, and other high-salt, high-fat, processed foods. · Read food labels and try to avoid saturated and trans fats. They increase your risk of heart disease by raising cholesterol levels. · Limit the amount of solid fat-butter, margarine, and shortening-you eat. Use olive, peanut, or canola oil when you cook. Bake, broil, and steam foods instead of frying them. · Eat a variety of fruit and vegetables every day. Dark green, deep orange, red, or yellow fruits and vegetables are especially good for you. Examples include spinach, carrots, peaches, and berries.   · Foods high in fiber can reduce your cholesterol and provide important vitamins and minerals. High-fiber foods include whole-grain cereals and breads, oatmeal, beans, brown rice, citrus fruits, and apples. · Eat lean proteins. Heart-healthy proteins include seafood, lean meats and poultry, eggs, beans, peas, nuts, seeds, and soy products. · Limit drinks and foods with added sugar. These include candy, desserts, and soda pop. Lifestyle changes  · If your doctor recommends it, get more exercise. Walking is a good choice. Bit by bit, increase the amount you walk every day. Try for at least 30 minutes on most days of the week. You also may want to swim, bike, or do other activities. · Do not smoke. If you need help quitting, talk to your doctor about stop-smoking programs and medicines. These can increase your chances of quitting for good. Quitting smoking may be the most important step you can take to protect your heart. It is never too late to quit. · Limit alcohol to 2 drinks a day for men and 1 drink a day for women. Too much alcohol can cause health problems. · Manage other health problems such as diabetes, high blood pressure, and high cholesterol. If you think you may have a problem with alcohol or drug use, talk to your doctor. Medicines  · Take your medicines exactly as prescribed. Call your doctor if you think you are having a problem with your medicine. · If your doctor recommends aspirin, take the amount directed each day. Make sure you take aspirin and not another kind of pain reliever, such as acetaminophen (Tylenol). When should you call for help? [id number] if you have symptoms of a heart attack. These may include:  · Chest pain or pressure, or a strange feeling in the chest.  · Sweating. · Shortness of breath. · Pain, pressure, or a strange feeling in the back, neck, jaw, or upper belly or in one or both shoulders or arms. · Lightheadedness or sudden weakness. · A fast or irregular heartbeat.   After you call 911, the  may tell you to chew 1 adult-strength or 2 to 4 low-dose aspirin. Wait for an ambulance. Do not try to drive yourself. Watch closely for changes in your health, and be sure to contact your doctor if you have any problems. Where can you learn more? Go to http://nayan-ace.info/  Enter F075 in the search box to learn more about \"A Healthy Heart: Care Instructions. \"  Current as of: 2019               Content Version: 12.5  © 7845-9163 [employer]. Care instructions adapted under license by [employer] (which disclaims liability or warranty for this information). If you have questions about a medical condition or this instruction, always ask your healthcare professional. [address] any warranty or liability for your use of this information. [employer] Activation    Thank you for requesting access to [employer]. Please follow the instructions below to securely access and download your online medical record. [employer] allows you to send messages to your doctor, view your test results, renew your prescriptions, schedule appointments, and more. How Do I Sign Up? 1. In your internet browser, go to [email or web address]. 2. Click on the First Time User? Click Here link in the Sign In box. You will see the New Member Sign Up page. 3. Enter your [employer] Access Code exactly as it appears below. You will not need to use this code after youve completed the sign-up process. If you do not sign up before the expiration date, you must request a new code. [employer] Access Code: [email or web address]  Expires: 2020 10:16 AM (This is the date your [employer] access code will )    4. Enter the last four digits of your Social Security Number (xxxx) and Date of Birth (mm/dd/yyyy) as indicated and click Submit. You will be taken to the next sign-up page. 5. Create a [employer] ID.  This will be your [employer] login ID and cannot be changed, so think of one that is secure and easy to remember. 6. Create a [employer] password. You can change your password at any time. 7. Enter your Password Reset Question and Answer. This can be used at a later time if you forget your password. 8. Enter your e-mail address. You will receive e-mail notification when new information is available in [address] 9. Click Sign Up. You can now view and download portions of your medical record. 10. Click the Download Summary menu link to download a portable copy of your medical information. Additional Information    If you have questions, please visit the Frequently Asked Questions section of the [employer] website at [email or web address] Remember, [employer] is NOT to be used for urgent needs. For medical emergencies, dial 911.

## 2021-05-24 NOTE — PROGRESS NOTES
HISTORY OF PRESENT ILLNESS  Akua Garcia is a 74 y.o. female. HPI Comments: 6/18 edema resolved on its own. Mild dyspnea on exertion persists. No chest pain  6/17 edema left leg for 2 months after [place] trip    Hypertension   The history is provided by the medical records and patient. This is a chronic problem. The problem has been gradually improving. Associated symptoms include shortness of breath. Pertinent negatives include no chest pain and no headaches. Cholesterol Problem   The history is provided by the medical records. This is a chronic problem. Associated symptoms include shortness of breath. Pertinent negatives include no chest pain and no headaches. Shortness of Breath   The history is provided by the patient. This is a new problem. The problem occurs intermittently. The current episode started more than 1 week ago. The problem has not changed since onset. Pertinent negatives include no fever, no headaches, no cough, no wheezing, no PND, no orthopnea, no chest pain, no vomiting, no rash, no leg swelling and no claudication. The problem's precipitants include exercise (1 flight). Leg Swelling   The history is provided by the patient. This is a new problem. The current episode started more than 1 week ago (4/17). The problem occurs daily. The problem has been resolved. Associated symptoms include shortness of breath. Pertinent negatives include no chest pain and no headaches. The symptoms are aggravated by standing. The symptoms are relieved by sleep (not much change). Review of Systems   Constitutional: Negative for chills, fever, malaise/fatigue and weight loss. HENT: Negative for nosebleeds. Eyes: Negative for discharge. Respiratory: Positive for shortness of breath. Negative for cough and wheezing. Cardiovascular: Negative for chest pain, palpitations, orthopnea, claudication, leg swelling and PND. Gastrointestinal: Negative for diarrhea, nausea and vomiting. Genitourinary: Negative for dysuria and hematuria. Musculoskeletal: Negative for joint pain. Skin: Negative for rash. Neurological: Negative for dizziness, seizures, loss of consciousness and headaches. Endo/Heme/Allergies: Negative for polydipsia. Does not bruise/bleed easily. Psychiatric/Behavioral: Negative for depression and substance abuse. The patient does not have insomnia. Allergies   Allergen Reactions    Phenergan [Promethazine] Other (comments)     Lethargy, slurred speech, hallucinations. Past Medical History:   Diagnosis Date    Arthritis     Chronic pain     right knee    Coronary atherosclerosis of native coronary artery 3/31/2015    possible cad abnormal stress test and ant t inversion in past-2007 asymptomatic     Essential hypertension, benign     GERD (gastroesophageal reflux disease)     Nausea & vomiting     Nonspecific abnormal electrocardiogram (ECG) (EKG) 3/31/2015    ant t inversion old     Other and unspecified hyperlipidemia     Transfusion history     No history of receiving blood or blood product transfusion(s). Family History   Problem Relation Age of Onset    Heart Attack Father      cause of death was MI       Social History   Substance Use Topics    Smoking status: Never Smoker    Smokeless tobacco: Never Used    Alcohol use No      Comment: does not give any significant history of alcohol usage        Current Outpatient Prescriptions   Medication Sig    pantoprazole (PROTONIX) 40 mg tablet Take 40 mg by mouth daily.  valsartan (DIOVAN) 160 mg tablet Take 1 Tab by mouth every evening. Indications: Hypertension    carvedilol (COREG) 6.25 mg tablet TAKE ONE TABLET BY MOUTH TWICE DAILY WITH  MEALS    cholecalciferol, VITAMIN D3, (VITAMIN D3) 5,000 unit tab tablet Take 5,000 Units by mouth daily.  atorvastatin (LIPITOR) 10 mg tablet Take 1 Tab by mouth daily.  amLODIPine (NORVASC) 5 mg tablet Take 1 Tab by mouth daily.  (Patient taking differently: Take 5 mg by mouth daily. Indications: HYPERTENSION)    nitroglycerin (NITROSTAT) 0.4 mg SL tablet 1 Tab by SubLINGual route every five (5) minutes as needed for Chest Pain for up to 3 doses.  aspirin 81 mg tablet Take 81 mg by mouth daily. No current facility-administered medications for this visit. Past Surgical History:   Procedure Laterality Date    HX GYN      polyp removal       Diagnostic Studies:  CARDIOLOGY STUDIES 6/13/2016 5/9/2016 3/22/2007   EKG Result - SR, NSTT a/s leads -   Exercise Nuclear Stress Test Result - - ant apical ischemia, nl ef   Pharmacological Nuclear Stress Test Result +CP, no sig EKG changes, nl scan, nl EF - -   Some recent data might be hidden       Visit Vitals    /75    Pulse 68    Ht 5' 5\" (1.651 m)    Wt 145 lb (65.8 kg)    BMI 24.13 kg/m2       Ms. Sharon Petty has a reminder for a \"due or due soon\" health maintenance. I have asked that she contact her primary care provider for follow-up on this health maintenance. Physical Exam   Constitutional: She is oriented to person, place, and time. She appears well-developed and well-nourished. No distress. HENT:   Head: Normocephalic and atraumatic. Mouth/Throat: Normal dentition. Eyes: Right eye exhibits no discharge. Left eye exhibits no discharge. No scleral icterus. Neck: Neck supple. No JVD present. Carotid bruit is not present. No thyromegaly present. Cardiovascular: Normal rate, regular rhythm, S1 normal, S2 normal, normal heart sounds and intact distal pulses. Exam reveals no gallop and no friction rub. No murmur heard. Pulmonary/Chest: Effort normal and breath sounds normal. She has no wheezes. She has no rales. Abdominal: Soft. She exhibits no mass. There is no tenderness. Musculoskeletal: She exhibits edema (2+). Lymphadenopathy:        Right cervical: No superficial cervical adenopathy present. Left cervical: No superficial cervical adenopathy present. Neurological: She is alert and oriented to person, place, and time. Skin: Skin is warm and dry. No rash noted. Psychiatric: She has a normal mood and affect. Her behavior is normal.       ASSESSMENT and PLAN    Nonspecific ST-T changes: 6/16 nl stress test  abnormal stress test and ant t inversion in past-2007  Asymptomatic    Increase Norvasc for hypertension. Follow-up home chart. Diagnoses and all orders for this visit:    1. Pure hypercholesterolemia  -     AMB POC EKG ROUTINE W/ 12 LEADS, INTER & REP    2. Essential hypertension  -     amLODIPine (NORVASC) 10 mg tablet; Take 1 Tab by mouth daily. 3. Nonspecific abnormal electrocardiogram (ECG) (EKG)        Pertinent laboratory and test data reviewed and discussed with patient. See patient instructions also for other medical advice given    Medications Discontinued During This Encounter   Medication Reason    omeprazole (PRILOSEC) 20 mg capsule Alternate Therapy    amLODIPine (NORVASC) 5 mg tablet Reorder       Follow-up Disposition:  Return in about 1 year (around 6/29/2019), or if symptoms worsen or fail to improve, for get recent labs from PCP, with ekg. Suturegard Retention Suture: 2-0 Nylon

## 2021-06-07 ENCOUNTER — OFFICE VISIT (OUTPATIENT)
Dept: CARDIOLOGY CLINIC | Age: 71
End: 2021-06-07
Payer: MEDICARE

## 2021-06-07 VITALS
BODY MASS INDEX: 24.93 KG/M2 | HEART RATE: 65 BPM | HEIGHT: 65 IN | DIASTOLIC BLOOD PRESSURE: 89 MMHG | SYSTOLIC BLOOD PRESSURE: 166 MMHG | WEIGHT: 149.6 LBS

## 2021-06-07 DIAGNOSIS — I10 ESSENTIAL HYPERTENSION: Primary | ICD-10-CM

## 2021-06-07 DIAGNOSIS — E78.00 PURE HYPERCHOLESTEROLEMIA: ICD-10-CM

## 2021-06-07 DIAGNOSIS — R94.31 NONSPECIFIC ABNORMAL ELECTROCARDIOGRAM (ECG) (EKG): ICD-10-CM

## 2021-06-07 PROCEDURE — 93000 ELECTROCARDIOGRAM COMPLETE: CPT | Performed by: INTERNAL MEDICINE

## 2021-06-07 PROCEDURE — 99214 OFFICE O/P EST MOD 30 MIN: CPT | Performed by: INTERNAL MEDICINE

## 2021-06-07 RX ORDER — AMLODIPINE BESYLATE 5 MG/1
5 TABLET ORAL DAILY
Qty: 90 TABLET | Refills: 1
Start: 2021-06-07 | End: 2022-07-18 | Stop reason: SDUPTHER

## 2021-06-07 RX ORDER — ATORVASTATIN CALCIUM 20 MG/1
20 TABLET, FILM COATED ORAL DAILY
Qty: 90 TABLET | Refills: 1 | Status: SHIPPED | OUTPATIENT
Start: 2021-06-07 | End: 2021-08-13 | Stop reason: DRUGHIGH

## 2021-06-07 NOTE — PROGRESS NOTES
1. Have you been to the ER, urgent care clinic since your last visit? Hospitalized since your last visit?     no    2. Have you seen or consulted any other health care providers outside of the [address] [place] [address] since your last visit? Include any pap smears or colon screening. Yes Where: pcp     3. Since your last visit, have you had any of the following symptoms? no       4. Have you had any blood work, X-rays or cardiac testing? Yes Where: [facility]         5. Where do you normally have your labs drawn? [facility]  6. Do you need any refills today?    no

## 2021-06-07 NOTE — PROGRESS NOTES
HISTORY OF PRESENT ILLNESS  Joanne Dent is a 78 y.o. female. 6/20 complains of high heart rate in 80s and 90s at times when she feels bad in the chest.  6/18 edema resolved on its own. Mild dyspnea on exertion persists. No chest pain  6/17 edema left leg for 2 months after [place] trip    Cholesterol Problem  The history is provided by the medical records. This is a chronic problem. Associated symptoms include shortness of breath. Pertinent negatives include no chest pain and no headaches. Hypertension  The history is provided by the medical records and patient. This is a chronic problem. The problem has been gradually improving. Associated symptoms include shortness of breath. Pertinent negatives include no chest pain and no headaches. Shortness of Breath  The history is provided by the patient. This is a new problem. The problem occurs intermittently. The current episode started more than 1 week ago. The problem has not changed since onset. Pertinent negatives include no fever, no headaches, no cough, no wheezing, no PND, no orthopnea, no chest pain, no vomiting, no rash, no leg swelling and no claudication. The problem's precipitants include exercise (1 flight). Review of Systems   Constitutional: Negative for chills, fever, malaise/fatigue and weight loss. HENT: Negative for nosebleeds. Eyes: Negative for discharge. Respiratory: Positive for shortness of breath. Negative for cough and wheezing. Cardiovascular: Negative for chest pain, palpitations, orthopnea, claudication, leg swelling and PND. Gastrointestinal: Negative for diarrhea, nausea and vomiting. Genitourinary: Negative for dysuria and hematuria. Musculoskeletal: Negative for joint pain. Skin: Negative for rash. Neurological: Negative for dizziness, seizures, loss of consciousness and headaches. Endo/Heme/Allergies: Negative for polydipsia. Does not bruise/bleed easily.    Psychiatric/Behavioral: Negative for depression and substance abuse. The patient does not have insomnia. Allergies   Allergen Reactions    Phenergan [Promethazine] Other (comments)     Lethargy, slurred speech, hallucinations. Past Medical History:   Diagnosis Date    Arthritis     Chronic pain     right knee    Coronary atherosclerosis of native coronary artery 3/31/2015    possible cad abnormal stress test and ant t inversion in past-2007 asymptomatic     Essential hypertension, benign     GERD (gastroesophageal reflux disease)     Nausea & vomiting     Nonspecific abnormal electrocardiogram (ECG) (EKG) 3/31/2015    ant t inversion old     Other and unspecified hyperlipidemia     Transfusion history     No history of receiving blood or blood product transfusion(s). Family History   Problem Relation Age of Onset    Heart Attack Father         cause of death was MI       Social History     Tobacco Use    Smoking status: Never Smoker    Smokeless tobacco: Never Used   Substance Use Topics    Alcohol use: No     Comment: does not give any significant history of alcohol usage    Drug use: No        Current Outpatient Medications   Medication Sig    carvediloL (COREG) 6.25 mg tablet TAKE ONE TABLET BY MOUTH TWICE DAILY WITH  MEALS    cyanocobalamin (VITAMIN B-12) 1,000 mcg sublingual tablet Take 1,000 mcg by mouth daily.  atorvastatin (LIPITOR) 10 mg tablet Take 1 Tab by mouth daily.  levothyroxine (SYNTHROID) 25 mcg tablet Take 1 Tab by mouth Daily (before breakfast).  valsartan (DIOVAN) 160 mg tablet Take 1 Tab by mouth every evening. Indications: high blood pressure    pantoprazole (PROTONIX) 40 mg tablet Take 40 mg by mouth daily.  cholecalciferol, VITAMIN D3, (VITAMIN D3) 5,000 unit tab tablet Take 5,000 Units by mouth daily.  nitroglycerin (NITROSTAT) 0.4 mg SL tablet 1 Tab by SubLINGual route every five (5) minutes as needed for Chest Pain for up to 3 doses.      No current facility-administered medications for this visit. Past Surgical History:   Procedure Laterality Date    HX GYN      polyp removal       Diagnostic Studies:  CARDIOLOGY STUDIES 6/13/2016 5/9/2016   EKG Result - SR, NSTT a/s leads   Pharmacological Nuclear Stress Test Result +CP, no sig EKG changes, nl scan, nl EF -   Some recent data might be hidden       Visit Vitals  BP (!) 166/89   Pulse 65   Ht 5' 5\" (1.651 m)   Wt 67.9 kg (149 lb 9.6 oz)   BMI 24.89 kg/m²       Ms. Brett Prieto has a reminder for a \"due or due soon\" health maintenance. I have asked that she contact her primary care provider for follow-up on this health maintenance. Physical Exam  Constitutional:       General: She is not in acute distress. Appearance: She is well-developed. HENT:      Head: Normocephalic and atraumatic. Mouth/Throat:      Dentition: Normal dentition. Eyes:      General: No scleral icterus. Right eye: No discharge. Left eye: No discharge. Neck:      Thyroid: No thyromegaly. Vascular: No carotid bruit or JVD. Cardiovascular:      Rate and Rhythm: Normal rate and regular rhythm. Pulses: Intact distal pulses. Heart sounds: Normal heart sounds, S1 normal and S2 normal. No murmur heard. No friction rub. No gallop. Pulmonary:      Effort: Pulmonary effort is normal.      Breath sounds: Normal breath sounds. No wheezing or rales. Abdominal:      Palpations: Abdomen is soft. There is no mass. Tenderness: There is no abdominal tenderness. Musculoskeletal:      Cervical back: Neck supple. Right lower leg: No edema. Left lower leg: No edema. Lymphadenopathy:      Cervical:      Right cervical: No superficial cervical adenopathy. Left cervical: No superficial cervical adenopathy. Skin:     General: Skin is warm and dry. Findings: No rash. Neurological:      Mental Status: She is alert and oriented to person, place, and time.    Psychiatric:         Behavior: Behavior normal. ASSESSMENT and PLAN    Nonspecific ST-T changes: 6/16 nl stress test  abnormal stress test and ant t inversion in past-2007 11/20 Dyspnea on exertion is chronic and likely due to deconditioning. Recommended that patient exercise regularly and report any significant symptoms. Increase carvedilol which will help with heart rate blood pressure. Follow home chart. Labs as ordered. Diagnoses and all orders for this visit:    1. Essential hypertension  -     amLODIPine (NORVASC) 5 mg tablet; Take 1 Tablet by mouth daily.  -     [facility] BP FLOWSHEET    2. Pure hypercholesterolemia  -     AMB POC EKG ROUTINE W/ 12 LEADS, INTER & REP  -     atorvastatin (LIPITOR) 20 mg tablet; Take 1 Tablet by mouth daily.  -     LIPID PANEL; Future  -     HEPATIC FUNCTION PANEL; Future    3. Nonspecific abnormal electrocardiogram (ECG) (EKG)        Pertinent laboratory and test data reviewed and discussed with patient. See patient instructions also for other medical advice given    Medications Discontinued During This Encounter   Medication Reason    amLODIPine (NORVASC) 10 mg tablet DISCONTINUED BY ANOTHER CLINICIAN    atorvastatin (LIPITOR) 10 mg tablet        Follow-up and Dispositions    · Return in about 1 year (around 6/7/2022), or if symptoms worsen or fail to improve, for with ekg, BP log x 4-5 days post med changes. 6/7/2021 blood pressure is not controlled as Norvasc was discontinued by GI due to GERD problems. She wants to try it again as GERD symptoms are better by taking 2 different PPIs. Will try lower dose at 5 mg and follow the home chart and if GERD symptoms recur, consider changing to a different medication. Lipids are acceptable but would like LDL to be reduced further. Increase Lipitor and follow the labs. Follow home chart for BP.

## 2021-06-07 NOTE — PATIENT INSTRUCTIONS
Medications Discontinued During This Encounter   Medication Reason    amLODIPine (NORVASC) 10 mg tablet DISCONTINUED BY ANOTHER CLINICIAN    atorvastatin (LIPITOR) 10 mg tablet      After the recommended changes have been made in blood pressure medicines, patient advised to keep BP/HR(pulse rate) chart twice daily and bring us results in next 4 to 5 days. Patient may send the results via \"My Chart\" if desired. Please rest for 5-10 minutes before checking blood pressure. Sit on a comfortable chair without crossing the legs and put your arm on a table. We recommend that you use an upper arm cuff. Check the blood pressure 3 times each time you check the blood pressure and record the lowest reading. If you check the blood pressure in both arms, use the higher reading. A Healthy Heart: Care Instructions  Your Care Instructions     Coronary artery disease, also called heart disease, occurs when a substance called plaque builds up in the vessels that supply oxygen-rich blood to your heart muscle. This can narrow the blood vessels and reduce blood flow. A heart attack happens when blood flow is completely blocked. A high-fat diet, smoking, and other factors increase the risk of heart disease. Your doctor has found that you have a chance of having heart disease. You can do lots of things to keep your heart healthy. It may not be easy, but you can change your diet, exercise more, and quit smoking. These steps really work to lower your chance of heart disease. Follow-up care is a key part of your treatment and safety. Be sure to make and go to all appointments, and call your doctor if you are having problems. It's also a good idea to know your test results and keep a list of the medicines you take. How can you care for yourself at home? Diet    · Use less salt when you cook and eat. This helps lower your blood pressure. Taste food before salting. Add only a little salt when you think you need it.  With time, your taste buds will adjust to less salt.     · Eat fewer snack items, fast foods, canned soups, and other high-salt, high-fat, processed foods.     · Read food labels and try to avoid saturated and trans fats. They increase your risk of heart disease by raising cholesterol levels.     · Limit the amount of solid fat-butter, margarine, and shortening-you eat. Use olive, peanut, or canola oil when you cook. Bake, broil, and steam foods instead of frying them.     · Eat a variety of fruit and vegetables every day. Dark green, deep orange, red, or yellow fruits and vegetables are especially good for you. Examples include spinach, carrots, peaches, and berries.     · Foods high in fiber can reduce your cholesterol and provide important vitamins and minerals. High-fiber foods include whole-grain cereals and breads, oatmeal, beans, brown rice, citrus fruits, and apples.     · Eat lean proteins. Heart-healthy proteins include seafood, lean meats and poultry, eggs, beans, peas, nuts, seeds, and soy products.     · Limit drinks and foods with added sugar. These include candy, desserts, and soda pop. Lifestyle changes    · If your doctor recommends it, get more exercise. Walking is a good choice. Bit by bit, increase the amount you walk every day. Try for at least 30 minutes on most days of the week. You also may want to swim, bike, or do other activities.     · Do not smoke. If you need help quitting, talk to your doctor about stop-smoking programs and medicines. These can increase your chances of quitting for good. Quitting smoking may be the most important step you can take to protect your heart. It is never too late to quit.     · Limit alcohol to 2 drinks a day for men and 1 drink a day for women. Too much alcohol can cause health problems.     · Manage other health problems such as diabetes, high blood pressure, and high cholesterol. If you think you may have a problem with alcohol or drug use, talk to your doctor. Medicines    · Take your medicines exactly as prescribed. Call your doctor if you think you are having a problem with your medicine.     · If your doctor recommends aspirin, take the amount directed each day. Make sure you take aspirin and not another kind of pain reliever, such as acetaminophen (Tylenol). When should you call for help? Call 911 if you have symptoms of a heart attack. These may include:    · Chest pain or pressure, or a strange feeling in the chest.     · Sweating.     · Shortness of breath.     · Pain, pressure, or a strange feeling in the back, neck, jaw, or upper belly or in one or both shoulders or arms.     · Lightheadedness or sudden weakness.     · A fast or irregular heartbeat. After you call 911, the  may tell you to chew 1 adult-strength or 2 to 4 low-dose aspirin. Wait for an ambulance. Do not try to drive yourself. Watch closely for changes in your health, and be sure to contact your doctor if you have any problems. Where can you learn more? Go to http://www.trevizo.com/  Enter F075 in the search box to learn more about \"A Healthy Heart: Care Instructions. \"  Current as of: August 31, 2020               Content Version: 12.8  © 2006-2021 Healthwise, Incorporated. Care instructions adapted under license by [employer] (which disclaims liability or warranty for this information). If you have questions about a medical condition or this instruction, always ask your healthcare professional. [place] [address] any warranty or liability for your use of this information.

## 2021-07-22 ENCOUNTER — TELEPHONE (OUTPATIENT)
Dept: CARDIOLOGY CLINIC | Age: 71
End: 2021-07-22

## 2021-07-22 NOTE — TELEPHONE ENCOUNTER
Per Dr Luis Alvarado after reviewing BP log from 6/8/21 to 7/5/21:    Increase Diovan to 160 mg bid   BP chart x 4-5 days    Called and spoke with patient. Advised to increase Diovan to 160 mg bid and repeat BP log, patient states understanding and will need new Rx sent to pharmacy.

## 2021-07-23 RX ORDER — VALSARTAN 160 MG/1
160 TABLET ORAL 2 TIMES DAILY
Qty: 180 TABLET | Refills: 2 | Status: SHIPPED | OUTPATIENT
Start: 2021-07-23 | End: 2022-07-11

## 2021-08-03 PROBLEM — I10 ESSENTIAL HYPERTENSION, BENIGN: Status: RESOLVED | Noted: 2021-08-03 | Resolved: 2021-08-03

## 2021-08-13 ENCOUNTER — TELEPHONE (OUTPATIENT)
Dept: CARDIOLOGY CLINIC | Age: 71
End: 2021-08-13

## 2021-08-13 DIAGNOSIS — E78.00 PURE HYPERCHOLESTEROLEMIA: ICD-10-CM

## 2021-08-13 DIAGNOSIS — I10 ESSENTIAL HYPERTENSION: Primary | ICD-10-CM

## 2021-08-13 LAB
ALBUMIN SERPL-MCNC: 4.5 G/DL (ref 3.8–4.8)
ALP SERPL-CCNC: 67 IU/L (ref 48–121)
ALT SERPL-CCNC: 11 IU/L (ref 0–32)
AST SERPL-CCNC: 17 IU/L (ref 0–40)
BILIRUB DIRECT SERPL-MCNC: 0.14 MG/DL (ref 0–0.4)
BILIRUB SERPL-MCNC: 0.4 MG/DL (ref 0–1.2)
CHOLEST SERPL-MCNC: 177 MG/DL (ref 100–199)
HDLC SERPL-MCNC: 63 MG/DL
LDLC SERPL CALC-MCNC: 97 MG/DL (ref 0–99)
PROT SERPL-MCNC: 7.1 G/DL (ref 6–8.5)
TRIGL SERPL-MCNC: 96 MG/DL (ref 0–149)
VLDLC SERPL CALC-MCNC: 17 MG/DL (ref 5–40)

## 2021-08-13 RX ORDER — ATORVASTATIN CALCIUM 40 MG/1
40 TABLET, FILM COATED ORAL DAILY
Qty: 90 TABLET | Refills: 3 | Status: SHIPPED | OUTPATIENT
Start: 2021-08-13 | End: 2022-11-04

## 2021-08-13 NOTE — PROGRESS NOTES
Please contact patient and do the following asap    Change Lipitor to 40 mg/day  Get fasting Lipid profile and LFTs in 3 months. Please reinforce diet and exercise.

## 2021-08-13 NOTE — TELEPHONE ENCOUNTER
----- Message from Jimmie Schwab MD sent at 8/13/2021  8:23 AM EDT -----  Please contact patient and do the following asap    Change Lipitor to 40 mg/day  Get fasting Lipid profile and LFTs in 3 months. Please reinforce diet and exercise.

## 2021-08-13 NOTE — TELEPHONE ENCOUNTER
Spoke to patient regarding lab/test per Dr. Madalyn Escobar, labs reviewed Change Lipitor to 40 mg/day. Get fasting Lipid profile and LFts in 3 months. Please reinforce diet and exercise. She voices understanding and acceptance of this advice and will call back if any further questions or concerns.

## 2021-08-16 DIAGNOSIS — E78.00 PURE HYPERCHOLESTEROLEMIA: ICD-10-CM

## 2021-09-17 ENCOUNTER — TELEPHONE (OUTPATIENT)
Dept: CARDIOLOGY CLINIC | Age: 71
End: 2021-09-17

## 2021-09-17 NOTE — TELEPHONE ENCOUNTER
Patient called and stated patient blood pressure was 167/102 last night. Patient has been keep a recording of her blood pressure and will fax it over to us today.

## 2021-09-20 NOTE — TELEPHONE ENCOUNTER
Per Milla Wise NP after reviewing BP log from 9/1/21 to 9/17/21:    Increase Norvasc to 5 mg BID  Follow up home BP chart

## 2021-09-21 NOTE — TELEPHONE ENCOUNTER
Called and advised to increase Norvasc to 5 mg BID, family states understanding, and will bring BP log to office x 1 week. Patient states ready for you to order stress test, please advise.

## 2021-10-05 ENCOUNTER — TELEPHONE (OUTPATIENT)
Dept: CARDIOLOGY CLINIC | Age: 71
End: 2021-10-05

## 2021-10-05 NOTE — TELEPHONE ENCOUNTER
Per blood pressure log.  BP improve continue current medicine and review NP    Spoke with patient and she is aware of BP medication and will continue taking as normal.

## 2021-11-13 DIAGNOSIS — I10 ESSENTIAL HYPERTENSION: ICD-10-CM

## 2021-11-13 DIAGNOSIS — E78.00 PURE HYPERCHOLESTEROLEMIA: ICD-10-CM

## 2021-11-20 LAB
ALBUMIN SERPL-MCNC: 4.4 G/DL (ref 3.8–4.8)
ALP SERPL-CCNC: 58 IU/L (ref 44–121)
ALT SERPL-CCNC: 10 IU/L (ref 0–32)
AST SERPL-CCNC: 15 IU/L (ref 0–40)
BILIRUB DIRECT SERPL-MCNC: 0.13 MG/DL (ref 0–0.4)
BILIRUB SERPL-MCNC: 0.3 MG/DL (ref 0–1.2)
CHOLEST SERPL-MCNC: 159 MG/DL (ref 100–199)
HDLC SERPL-MCNC: 57 MG/DL
LDLC SERPL CALC-MCNC: 86 MG/DL (ref 0–99)
PROT SERPL-MCNC: 6.8 G/DL (ref 6–8.5)
TRIGL SERPL-MCNC: 88 MG/DL (ref 0–149)
VLDLC SERPL CALC-MCNC: 16 MG/DL (ref 5–40)

## 2022-03-11 PROBLEM — N81.3 THIRD DEGREE UTERINE PROLAPSE: Status: ACTIVE | Noted: 2022-03-11

## 2022-03-11 PROBLEM — H35.3131 EARLY STAGE DRY AGE-RELATED MACULAR DEGENERATION OF BOTH EYES: Status: ACTIVE | Noted: 2019-10-16

## 2022-03-11 PROBLEM — D64.9 CHRONIC ANEMIA: Status: ACTIVE | Noted: 2017-08-10

## 2022-03-11 PROBLEM — N13.30 HYDRONEPHROSIS: Status: ACTIVE | Noted: 2022-03-11

## 2022-03-11 PROBLEM — H04.123 DRY EYE SYNDROME, BILATERAL: Status: ACTIVE | Noted: 2017-11-21

## 2022-03-11 PROBLEM — H02.821 CYST OF RIGHT UPPER EYELID: Status: ACTIVE | Noted: 2019-10-16

## 2022-03-11 PROBLEM — E78.00 PURE HYPERCHOLESTEROLEMIA: Status: ACTIVE | Noted: 2017-07-25

## 2022-03-11 PROBLEM — N81.6 RECTOCELE: Status: ACTIVE | Noted: 2018-12-05

## 2022-03-11 PROBLEM — Z96.1 PSEUDOPHAKIA: Status: ACTIVE | Noted: 2021-12-03

## 2022-03-18 PROBLEM — H04.123 DRY EYE SYNDROME, BILATERAL: Status: ACTIVE | Noted: 2017-11-21

## 2022-03-18 PROBLEM — E78.00 PURE HYPERCHOLESTEROLEMIA: Status: ACTIVE | Noted: 2017-07-25

## 2022-03-19 PROBLEM — D64.9 CHRONIC ANEMIA: Status: ACTIVE | Noted: 2017-08-10

## 2022-03-19 PROBLEM — H02.821 CYST OF RIGHT UPPER EYELID: Status: ACTIVE | Noted: 2019-10-16

## 2022-03-19 PROBLEM — N81.3 THIRD DEGREE UTERINE PROLAPSE: Status: ACTIVE | Noted: 2022-03-11

## 2022-03-19 PROBLEM — N81.6 RECTOCELE: Status: ACTIVE | Noted: 2018-12-05

## 2022-03-19 PROBLEM — H35.3131 EARLY STAGE DRY AGE-RELATED MACULAR DEGENERATION OF BOTH EYES: Status: ACTIVE | Noted: 2019-10-16

## 2022-03-20 PROBLEM — N13.30 HYDRONEPHROSIS: Status: ACTIVE | Noted: 2022-03-11

## 2022-03-20 PROBLEM — Z96.1 PSEUDOPHAKIA: Status: ACTIVE | Noted: 2021-12-03

## 2022-07-11 RX ORDER — VALSARTAN 160 MG/1
TABLET ORAL
Qty: 180 TABLET | Refills: 0 | Status: SHIPPED | OUTPATIENT
Start: 2022-07-11

## 2022-07-18 ENCOUNTER — OFFICE VISIT (OUTPATIENT)
Dept: CARDIOLOGY CLINIC | Age: 72
End: 2022-07-18
Payer: MEDICARE

## 2022-07-18 VITALS
HEIGHT: 65 IN | BODY MASS INDEX: 24.66 KG/M2 | OXYGEN SATURATION: 98 % | DIASTOLIC BLOOD PRESSURE: 60 MMHG | SYSTOLIC BLOOD PRESSURE: 135 MMHG | HEART RATE: 65 BPM | WEIGHT: 148 LBS

## 2022-07-18 DIAGNOSIS — E78.00 PURE HYPERCHOLESTEROLEMIA: ICD-10-CM

## 2022-07-18 DIAGNOSIS — Z01.810 PREOP CARDIOVASCULAR EXAM: ICD-10-CM

## 2022-07-18 DIAGNOSIS — R94.31 ABNORMAL EKG: ICD-10-CM

## 2022-07-18 DIAGNOSIS — I10 ESSENTIAL HYPERTENSION: Primary | ICD-10-CM

## 2022-07-18 PROCEDURE — G8420 CALC BMI NORM PARAMETERS: HCPCS | Performed by: INTERNAL MEDICINE

## 2022-07-18 PROCEDURE — 1123F ACP DISCUSS/DSCN MKR DOCD: CPT | Performed by: INTERNAL MEDICINE

## 2022-07-18 PROCEDURE — G8427 DOCREV CUR MEDS BY ELIG CLIN: HCPCS | Performed by: INTERNAL MEDICINE

## 2022-07-18 PROCEDURE — G8752 SYS BP LESS 140: HCPCS | Performed by: INTERNAL MEDICINE

## 2022-07-18 PROCEDURE — 1101F PT FALLS ASSESS-DOCD LE1/YR: CPT | Performed by: INTERNAL MEDICINE

## 2022-07-18 PROCEDURE — G8536 NO DOC ELDER MAL SCRN: HCPCS | Performed by: INTERNAL MEDICINE

## 2022-07-18 PROCEDURE — 3017F COLORECTAL CA SCREEN DOC REV: CPT | Performed by: INTERNAL MEDICINE

## 2022-07-18 PROCEDURE — 1090F PRES/ABSN URINE INCON ASSESS: CPT | Performed by: INTERNAL MEDICINE

## 2022-07-18 PROCEDURE — G8510 SCR DEP NEG, NO PLAN REQD: HCPCS | Performed by: INTERNAL MEDICINE

## 2022-07-18 PROCEDURE — 99214 OFFICE O/P EST MOD 30 MIN: CPT | Performed by: INTERNAL MEDICINE

## 2022-07-18 PROCEDURE — 93000 ELECTROCARDIOGRAM COMPLETE: CPT | Performed by: INTERNAL MEDICINE

## 2022-07-18 PROCEDURE — G8400 PT W/DXA NO RESULTS DOC: HCPCS | Performed by: INTERNAL MEDICINE

## 2022-07-18 PROCEDURE — G8754 DIAS BP LESS 90: HCPCS | Performed by: INTERNAL MEDICINE

## 2022-07-18 RX ORDER — AMLODIPINE BESYLATE 5 MG/1
5 TABLET ORAL DAILY
Qty: 90 TABLET | Refills: 1 | Status: SHIPPED | OUTPATIENT
Start: 2022-07-18

## 2022-07-18 RX ORDER — LANOLIN ALCOHOL/MO/W.PET/CERES
CREAM (GRAM) TOPICAL
COMMUNITY

## 2022-07-18 NOTE — PROGRESS NOTES
1. Have you been to the ER, urgent care clinic since your last visit? Hospitalized since your last visit? No    2. Have you seen or consulted any other health care providers outside of the [address] [place] [address] since your last visit? Include any pap smears or colon screening. Yes Where: Dr. Sarmad Purdy PCP for Follow-up     3. Since your last visit, have you had any of the following symptoms?      swelling in legs/arms. 4.  Have you had any blood work, X-rays or cardiac testing? Yes Where: PCP     Requested: YES     In [facility]: YES    5. Where do you normally have your labs drawn? PCP    6. Do you need any refills today?    No

## 2022-07-18 NOTE — PROGRESS NOTES
HISTORY OF PRESENT ILLNESS  Dionisio Sauceda is a 81 y.o. female. Follow-up of hypertension, hyperlipidemia    6/20 complains of high heart rate in 80s and 90s at times when she feels bad in the chest.  6/18 edema resolved on its own. Mild dyspnea on exertion persists. No chest pain  6/17 edema left leg for 2 months after [place] trip    Shortness of Breath  The history is provided by the patient. This is a new problem. The problem occurs intermittently. The current episode started more than 1 week ago. The problem has not changed since onset. Pertinent negatives include no fever, no headaches, no cough, no wheezing, no PND, no orthopnea, no chest pain, no vomiting, no rash, no leg swelling and no claudication. The problem's precipitants include exercise (1 flight). Follow-up  Associated symptoms include shortness of breath. Pertinent negatives include no chest pain and no headaches. Review of Systems   Constitutional: Negative for chills, fever, malaise/fatigue and weight loss. HENT: Negative for nosebleeds. Eyes: Negative for discharge. Respiratory: Positive for shortness of breath. Negative for cough and wheezing. Cardiovascular: Negative for chest pain, palpitations, orthopnea, claudication, leg swelling and PND. Gastrointestinal: Negative for diarrhea, nausea and vomiting. Genitourinary: Negative for dysuria and hematuria. Musculoskeletal: Negative for joint pain. Skin: Negative for rash. Neurological: Negative for dizziness, seizures, loss of consciousness and headaches. Endo/Heme/Allergies: Negative for polydipsia. Does not bruise/bleed easily. Psychiatric/Behavioral: Negative for depression and substance abuse. The patient does not have insomnia. Allergies   Allergen Reactions    Phenergan [Promethazine] Other (comments)     Lethargy, slurred speech, hallucinations.        Past Medical History:   Diagnosis Date    Arthritis     Chronic pain     right knee    Coronary atherosclerosis of native coronary artery 3/31/2015    possible cad abnormal stress test and ant t inversion in past-2007 asymptomatic     Essential hypertension, benign     GERD (gastroesophageal reflux disease)     Nausea & vomiting     Nonspecific abnormal electrocardiogram (ECG) (EKG) 3/31/2015    ant t inversion old     Other and unspecified hyperlipidemia     Transfusion history     No history of receiving blood or blood product transfusion(s). Family History   Problem Relation Age of Onset    Heart Attack Father         cause of death was MI       Social History     Tobacco Use    Smoking status: Never Smoker    Smokeless tobacco: Never Used   Substance Use Topics    Alcohol use: No     Comment: does not give any significant history of alcohol usage    Drug use: No        Current Outpatient Medications   Medication Sig    ferrous sulfate (FeroSuL) 325 mg (65 mg iron) tablet Take  by mouth Daily (before breakfast).  valsartan (DIOVAN) 160 mg tablet TAKE 1 TABLET BY MOUTH TWICE DAILY FOR HIGH BLOOD PRESSURE    estradioL (ESTRACE) 0.01 % (0.1 mg/gram) vaginal cream Apply to vaginal vault with fingers daily    atorvastatin (LIPITOR) 40 mg tablet Take 1 Tablet by mouth daily.  amLODIPine (NORVASC) 5 mg tablet Take 1 Tablet by mouth daily.  cyanocobalamin (VITAMIN B-12) 1,000 mcg sublingual tablet Take 1,000 mcg by mouth daily.  levothyroxine (SYNTHROID) 25 mcg tablet Take 1 Tab by mouth Daily (before breakfast).  pantoprazole (PROTONIX) 40 mg tablet Take 40 mg by mouth daily.  cholecalciferol, VITAMIN D3, (VITAMIN D3) 5,000 unit tab tablet Take 5,000 Units by mouth daily.  nitroglycerin (NITROSTAT) 0.4 mg SL tablet 1 Tab by SubLINGual route every five (5) minutes as needed for Chest Pain for up to 3 doses.  carvediloL (COREG) 6.25 mg tablet TAKE ONE TABLET BY MOUTH TWICE DAILY WITH  MEALS (Patient taking differently: Take 12.5 mg by mouth two (2) times a day.  TAKE ONE TABLET BY MOUTH TWICE DAILY WITH  MEALS)     No current facility-administered medications for this visit. Past Surgical History:   Procedure Laterality Date    HX GYN      polyp removal       Diagnostic Studies:      Visit Vitals  /60 (BP 1 Location: Left upper arm, BP Patient Position: Sitting, BP Cuff Size: Adult)   Pulse 65   Ht 5' 5\" (1.651 m)   Wt 67.1 kg (148 lb)   SpO2 98%   BMI 24.63 kg/m²       Ms. Lesly Rivera has a reminder for a \"due or due soon\" health maintenance. I have asked that she contact her primary care provider for follow-up on this health maintenance. Physical Exam  Constitutional:       General: She is not in acute distress. Appearance: She is well-developed. HENT:      Head: Normocephalic and atraumatic. Mouth/Throat:      Dentition: Normal dentition. Eyes:      General: No scleral icterus. Right eye: No discharge. Left eye: No discharge. Neck:      Thyroid: No thyromegaly. Vascular: No carotid bruit or JVD. Cardiovascular:      Rate and Rhythm: Normal rate and regular rhythm. Pulses: Intact distal pulses. Heart sounds: Normal heart sounds, S1 normal and S2 normal. No murmur heard. No friction rub. No gallop. Pulmonary:      Effort: Pulmonary effort is normal.      Breath sounds: Normal breath sounds. No wheezing or rales. Abdominal:      Palpations: Abdomen is soft. There is no mass. Tenderness: There is no abdominal tenderness. Musculoskeletal:      Cervical back: Neck supple. Right lower leg: No edema. Left lower leg: No edema. Lymphadenopathy:      Cervical:      Right cervical: No superficial cervical adenopathy. Left cervical: No superficial cervical adenopathy. Skin:     General: Skin is warm and dry. Findings: No rash. Neurological:      Mental Status: She is alert and oriented to person, place, and time.    Psychiatric:         Behavior: Behavior normal.         ASSESSMENT and PLAN    Results for Samson Moss (MRN [id number]) as of 7/18/2022 10:00   Ref. Range 7/3/2017 09:28 8/12/2021 08:46 11/19/2021 08:23   Triglyceride Latest Ref Range: 0 - 149 mg/dL 96 96 88   Cholesterol, total Latest Ref Range: 100 - 199 mg/dL 191 177 159   HDL Cholesterol Latest Ref Range: >39 mg/dL 78 (H) 63 57   CHOL/HDL Ratio Latest Ref Range: 0 - 5.0   2.4     VLDL, calculated Latest Ref Range: 5 - 40 mg/dL 19.2 17 16   LDL, calculated Latest Ref Range: 0 - 99 mg/dL 93.8 97 86     Nonspecific ST-T changes: 6/16 nl stress test  abnormal stress test and ant t inversion in past-2007 11/20 Dyspnea on exertion is chronic and likely due to deconditioning. Recommended that patient exercise regularly and report any significant symptoms. Increase carvedilol which will help with heart rate blood pressure. Follow home chart. Labs as ordered. 6/7/2021 blood pressure is not controlled as Norvasc was discontinued by GI due to GERD problems. She wants to try it again as GERD symptoms are better by taking 2 different PPIs. Will try lower dose at 5 mg and follow the home chart and if GERD symptoms recur, consider changing to a different medication. Lipids are acceptable but would like LDL to be reduced further. Increase Lipitor and follow the labs. Follow home chart for BP. Diagnoses and all orders for this visit:    1. Essential hypertension  -     AMB POC EKG ROUTINE W/ 12 LEADS, INTER & REP  -     amLODIPine (NORVASC) 5 mg tablet; Take 1 Tablet by mouth daily. 2. Pure hypercholesterolemia    3. Preop cardiovascular exam    4. Abnormal EKG  -     NUCLEAR CARDIAC STRESS TEST; Future        Pertinent laboratory and test data reviewed and discussed with patient.   See patient instructions also for other medical advice given    Medications Discontinued During This Encounter   Medication Reason    amLODIPine (NORVASC) 5 mg tablet REORDER       Follow-up and Dispositions    · Return in about 3 months (around 10/18/2022), or if symptoms worsen or fail to improve, for post test, clear after stress test.       7/18/2022 blood pressure is controlled. Lipids are acceptable since she has no demonstrable ASHD. Given abnormal EKG and a remote stress test, we will repeat a stress test prior to expected surgery. Healthy diet discussed and Mediterranean diet guidelines given.

## 2022-07-18 NOTE — PATIENT INSTRUCTIONS
Medications Discontinued During This Encounter   Medication Reason    amLODIPine (NORVASC) 5 mg tablet REORDER          Learning About the Mediterranean Diet  What is the Mediterranean diet? The Mediterranean diet is a style of eating rather than a diet plan. It features foods eaten in [place], [place], [place] and [place], and other countries along the [facility]. It emphasizes eating foods like fish, fruits, vegetables, beans, high-fiber breads and whole grains, nuts, and olive oil. This style of eating includes limited red meat, cheese, and sweets. Why choose the Mediterranean diet? A Mediterranean-style diet may improve heart health. It contains more fat than other heart-healthy diets. But the fats are mainly from nuts, unsaturated oils (such as fish oils and olive oil), and certain nut or seed oils (such as canola, soybean, or flaxseed oil). These fats may help protect the heart and blood vessels. How can you get started on the Mediterranean diet? Here are some things you can do to switch to a more Mediterranean way of eating. What to eat  · Eat a variety of fruits and vegetables each day, such as grapes, blueberries, tomatoes, broccoli, peppers, figs, olives, spinach, eggplant, beans, lentils, and chickpeas. · Eat a variety of whole-grain foods each day, such as oats, brown rice, and whole wheat bread, pasta, and couscous. · Eat fish at least 2 times a week. Try tuna, salmon, mackerel, lake trout, herring, or sardines. · Eat moderate amounts of low-fat dairy products, such as milk, cheese, or yogurt. · Eat moderate amounts of poultry and eggs. · Choose healthy (unsaturated) fats, such as nuts, olive oil, and certain nut or seed oils like canola, soybean, and flaxseed. · Limit unhealthy (saturated) fats, such as butter, palm oil, and coconut oil. And limit fats found in animal products, such as meat and dairy products made with whole milk.  Try to eat red meat only a few times a month in very small amounts. · Limit sweets and desserts to only a few times a week. This includes sugar-sweetened drinks like soda. The Mediterranean diet may also include red wine with your meal--1 glass each day for women and up to 2 glasses a day for men. Tips for eating at home  · Use herbs, spices, garlic, lemon zest, and citrus juice instead of salt to add flavor to foods. · Add avocado slices to your sandwich instead of mills. · Have fish for lunch or dinner instead of red meat. Brush the fish with olive oil, and broil or grill it. · Sprinkle your salad with seeds or nuts instead of cheese. · Cook with olive or canola oil instead of butter or oils that are high in saturated fat. · Switch from 2% milk or whole milk to 1% or fat-free milk. · Dip raw vegetables in a vinaigrette dressing or hummus instead of dips made from mayonnaise or sour cream.  · Have a piece of fruit for dessert instead of a piece of cake. Try baked apples, or have some dried fruit. Tips for eating out  · Try broiled, grilled, baked, or poached fish instead of having it fried or breaded. · Ask your  to have your meals prepared with olive oil instead of butter. · Order dishes made with marinara sauce or sauces made from olive oil. Avoid sauces made from cream or mayonnaise. · Choose whole-grain breads, whole wheat pasta and pizza crust, brown rice, beans, and lentils. · Cut back on butter or margarine on bread. Instead, you can dip your bread in a small amount of olive oil. · Ask for a side salad or grilled vegetables instead of french fries or chips. Where can you learn more? Go to http://nayan-ace.info/  Enter O407 in the search box to learn more about \"Learning About the Mediterranean Diet. \"  Current as of: September 8, 2021               Content Version: 13.2  © 9846-2866 Healthwise, Incorporated.    Care instructions adapted under license by [employer] (which disclaims liability or warranty for this information). If you have questions about a medical condition or this instruction, always ask your healthcare professional. [place] [address] any warranty or liability for your use of this information.

## 2022-08-18 ENCOUNTER — TELEPHONE (OUTPATIENT)
Dept: CARDIOLOGY CLINIC | Age: 72
End: 2022-08-18

## 2022-08-18 NOTE — LETTER
2022       Surgeon  Name  Surgeon Address      Dear ***:    Re: Hardy [place]   : 1950       Ms. Taryn Hale is cleared from a cardiac standpoint with mild risk for *** surgery scheduled on 2022. If you have any questions or any further assistance is needed please contact our office. Sincerely,             Margarito Graves.  Gamaliel Dexter M.D.    cc:  Cynthia Crews MD   ***

## 2022-08-18 NOTE — LETTER
2022           Dear Tree Garcia:    Re: Morgan Raven   : 1950       Ms. Bhanu Luke is cleared from a cardiac standpoint with mild risk for hysterectomy surgery scheduled on 2022. If you have any questions or any further assistance is needed please contact our office. Sincerely,             Willy Ohara.  Abigail Montano M.D.

## 2022-09-16 ENCOUNTER — OFFICE VISIT (OUTPATIENT)
Dept: ORTHOPEDIC SURGERY | Age: 72
End: 2022-09-16
Payer: MEDICARE

## 2022-09-16 VITALS — BODY MASS INDEX: 27.38 KG/M2 | WEIGHT: 145 LBS | HEIGHT: 61 IN

## 2022-09-16 DIAGNOSIS — M17.12 PRIMARY OSTEOARTHRITIS OF LEFT KNEE: Primary | ICD-10-CM

## 2022-09-16 DIAGNOSIS — M25.562 LEFT KNEE PAIN, UNSPECIFIED CHRONICITY: ICD-10-CM

## 2022-09-16 PROCEDURE — 1123F ACP DISCUSS/DSCN MKR DOCD: CPT | Performed by: ORTHOPAEDIC SURGERY

## 2022-09-16 PROCEDURE — 1101F PT FALLS ASSESS-DOCD LE1/YR: CPT | Performed by: ORTHOPAEDIC SURGERY

## 2022-09-16 PROCEDURE — G8427 DOCREV CUR MEDS BY ELIG CLIN: HCPCS | Performed by: ORTHOPAEDIC SURGERY

## 2022-09-16 PROCEDURE — G8417 CALC BMI ABV UP PARAM F/U: HCPCS | Performed by: ORTHOPAEDIC SURGERY

## 2022-09-16 PROCEDURE — 3017F COLORECTAL CA SCREEN DOC REV: CPT | Performed by: ORTHOPAEDIC SURGERY

## 2022-09-16 PROCEDURE — G8536 NO DOC ELDER MAL SCRN: HCPCS | Performed by: ORTHOPAEDIC SURGERY

## 2022-09-16 PROCEDURE — 99203 OFFICE O/P NEW LOW 30 MIN: CPT | Performed by: ORTHOPAEDIC SURGERY

## 2022-09-16 PROCEDURE — G8400 PT W/DXA NO RESULTS DOC: HCPCS | Performed by: ORTHOPAEDIC SURGERY

## 2022-09-16 PROCEDURE — G8432 DEP SCR NOT DOC, RNG: HCPCS | Performed by: ORTHOPAEDIC SURGERY

## 2022-09-16 PROCEDURE — 1090F PRES/ABSN URINE INCON ASSESS: CPT | Performed by: ORTHOPAEDIC SURGERY

## 2022-09-16 PROCEDURE — G8756 NO BP MEASURE DOC: HCPCS | Performed by: ORTHOPAEDIC SURGERY

## 2022-09-16 RX ORDER — LEVOTHYROXINE SODIUM 50 UG/1
TABLET ORAL
COMMUNITY
Start: 2022-07-24

## 2022-09-16 RX ORDER — CARVEDILOL 12.5 MG/1
TABLET ORAL
COMMUNITY
Start: 2022-09-09

## 2022-09-16 NOTE — PATIENT INSTRUCTIONS
Knee Arthritis: Care Instructions  Your Care Instructions     Knee arthritis is a breakdown of the cartilage that cushions your knee joint. When the cartilage wears down, your bones rub against each other. This causes pain and stiffness. Knee arthritis tends to get worse with time. Treatment for knee arthritis involves reducing pain, making the leg muscles stronger, and staying at a healthy body weight. The treatment usually does not improve the health of the cartilage, but it can reduce pain and improve how well your knee works. You can take simple measures to protect your knee joints, ease your pain, and help you stay active. Follow-up care is a key part of your treatment and safety. Be sure to make and go to all appointments, and call your doctor if you are having problems. It's also a good idea to know your test results and keep a list of the medicines you take. How can you care for yourself at home? Know that knee arthritis will cause more pain on some days than on others. Stay at a healthy weight. Lose weight if you are overweight. When you stand up, the pressure on your knees from every pound of body weight is multiplied four times. So if you lose 10 pounds, you will reduce the pressure on your knees by 40 pounds. Talk to your doctor or physical therapist about exercises that will help ease joint pain. Stretch to help prevent stiffness and to prevent injury before you exercise. You may enjoy gentle forms of yoga to help keep your knee joints and muscles flexible. Walk instead of jog. Ride a bike. This makes your thigh muscles stronger and takes pressure off your knee. Wear well-fitting and comfortable shoes. Exercise in chest-deep water. This can help you exercise longer with less pain. Avoid exercises that include squatting or kneeling. They can put a lot of strain on your knees. Talk to your doctor to make sure that the exercise you do is not making the arthritis worse.   Do not sit for long periods of time. Try to walk once in a while to keep your knee from getting stiff. Ask your doctor or physical therapist whether shoe inserts may reduce your arthritis pain. If you can afford it, get new athletic shoes at least every year. This can help reduce the strain on your knees. Use a device to help you do everyday activities. A cane or walking stick can help you keep your balance when you walk. Hold the cane or walking stick in the hand opposite the painful knee. If you feel like you may fall when you walk, try using crutches or a front-wheeled walker. These can prevent falls that could cause more damage to your knee. A knee brace may help keep your knee stable and prevent pain. You also can use other things to make life easier, such as a higher toilet seat and handrails in the bathtub or shower. Take pain medicines exactly as directed. Do not wait until you are in severe pain. You will get better results if you take it sooner. If you are not taking a prescription pain medicine, take an over-the-counter medicine such as acetaminophen (Tylenol), ibuprofen (Advil, Motrin), or naproxen (Aleve). Read and follow all instructions on the label. Do not take two or more pain medicines at the same time unless the doctor told you to. Many pain medicines have acetaminophen, which is Tylenol. Too much acetaminophen (Tylenol) can be harmful. Tell your doctor if you take a blood thinner, have diabetes, or have allergies to shellfish. Ask your doctor if you might benefit from a shot of steroid medicine into your knee. This may provide pain relief for several months. Many people take the supplements glucosamine and chondroitin for osteoarthritis. Some people feel they help, but the medical research does not show that they work. Talk to your doctor before you take these supplements. When should you call for help?    Call your doctor now or seek immediate medical care if:    You have sudden swelling, warmth, or pain in your knee. You have knee pain and a fever or rash. You have such bad pain that you cannot use your knee. Watch closely for changes in your health, and be sure to contact your doctor if you have any problems. Where can you learn more? Go to http://www.gray.com/  Enter W187 in the search box to learn more about \"Knee Arthritis: Care Instructions. \"  Current as of: December 20, 2021               Content Version: 13.2  © 2006-2022 [employer]. Care instructions adapted under license by [employer] (which disclaims liability or warranty for this information). If you have questions about a medical condition or this instruction, always ask your healthcare professional. [address] any warranty or liability for your use of this information.

## 2022-09-16 NOTE — LETTER
9/19/2022    Patient: Steph Victor   YOB: 1950   Date of Visit: 9/16/2022     Agustin Mccain MD  [facility] [address] [address]  Via Fax: [phone]    Dear Agustin Mccain MD,      Thank you for referring Ms. Steph Victor to [address] [place] [address] for evaluation. My notes for this consultation are attached. If you have questions, please do not hesitate to call me. I look forward to following your patient along with you.       Sincerely,    Dani Pierre MD

## 2022-09-16 NOTE — PROGRESS NOTES
Name: Timothy Brian    : 1950     Service Dept: [address] and Sports Medicine    Chief Complaint   Patient presents with    Knee Pain        Visit Vitals  Ht 5' 1\" (1.549 m)   Wt 145 lb (65.8 kg)   BMI 27.40 kg/m²        Allergies   Allergen Reactions    Phenergan [Promethazine] Other (comments)     Lethargy, slurred speech, hallucinations. Current Outpatient Medications   Medication Sig Dispense Refill    carvediloL (COREG) 12.5 mg tablet TAKE 1/2 (ONE-HALF) TABLET BY MOUTH IN THE MORNING AND 1 IN THE EVENING      Euthyrox 50 mcg tablet TAKE 1 TABLET BY MOUTH ONCE DAILY IN THE MORNING AT 6AM ON EMPTY STOMACH      ferrous sulfate (FeroSuL) 325 mg (65 mg iron) tablet Take  by mouth Daily (before breakfast). amLODIPine (NORVASC) 5 mg tablet Take 1 Tablet by mouth daily. 90 Tablet 1    valsartan (DIOVAN) 160 mg tablet TAKE 1 TABLET BY MOUTH TWICE DAILY FOR HIGH BLOOD PRESSURE 180 Tablet 0    estradioL (ESTRACE) 0.01 % (0.1 mg/gram) vaginal cream Apply to vaginal vault with fingers daily 42.5 g 1    atorvastatin (LIPITOR) 40 mg tablet Take 1 Tablet by mouth daily. 90 Tablet 3    cyanocobalamin (VITAMIN B-12) 1,000 mcg sublingual tablet Take 1,000 mcg by mouth daily. pantoprazole (PROTONIX) 40 mg tablet Take 40 mg by mouth daily. cholecalciferol, VITAMIN D3, (VITAMIN D3) 5,000 unit tab tablet Take 5,000 Units by mouth daily. nitroglycerin (NITROSTAT) 0.4 mg SL tablet 1 Tab by SubLINGual route every five (5) minutes as needed for Chest Pain for up to 3 doses.  25 Tab 0      Patient Active Problem List   Diagnosis Code    Transfusion history Z92.89    Benign essential hypertension I10    Pure hypercholesterolemia E78.00    Abnormal electrocardiography R94.31    Coronary atherosclerosis of native coronary artery I25.10    Preop cardiovascular exam Z01.810    HTN (hypertension) I10    Right knee DJD M17.11    Osteoarthritis of right knee M17.11    Confusion R41.0 Atrophic vaginitis N95.2    Chronic anemia D64.9    Cyst of right upper eyelid H02.821    Dry eye syndrome, bilateral H04.123    Early stage dry age-related macular degeneration of both eyes H35.3131    Gastroesophageal reflux disease K21.9    Hypothyroidism E03.9    Impaired fasting glucose R73.01    Low back pain M54.50    Pseudophakia Z96.1    Rectocele N81.6    Sciatica M54.30    Uterovaginal prolapse N81.4    Hydronephrosis N13.30    Third degree uterine prolapse N81.3      Family History   Problem Relation Age of Onset    Heart Attack Father         cause of death was MI      Social History     Socioeconomic History    Marital status:    Tobacco Use    Smoking status: Never    Smokeless tobacco: Never   Substance and Sexual Activity    Alcohol use: No     Comment: does not give any significant history of alcohol usage    Drug use: No      Past Surgical History:   Procedure Laterality Date    HX GYN      polyp removal      Past Medical History:   Diagnosis Date    Arthritis     Chronic pain     right knee    Coronary atherosclerosis of native coronary artery 3/31/2015    possible cad abnormal stress test and ant t inversion in past-2007 asymptomatic     Essential hypertension, benign     GERD (gastroesophageal reflux disease)     Nausea & vomiting     Nonspecific abnormal electrocardiogram (ECG) (EKG) 3/31/2015    ant t inversion old     Other and unspecified hyperlipidemia     Transfusion history     No history of receiving blood or blood product transfusion(s). I have reviewed and agree with [address] and ROS and intake form in chart and the record furthermore I have reviewed prior medical record(s) regarding this patients care during this appointment.      Review of Systems:   Patient is a pleasant appearing individual, appropriately dressed, well hydrated, well nourished, who is alert, appropriately oriented for age, and in no acute distress with a normal gait and normal affect who does not appear to be in any significant pain. Physical Exam:  Left Knee -Decrease range of motion with flexion, Knee arc of greater than 50 degrees, Some crepitation, Grossly neurovascularly intact, Good cap refill, No skin lesion, Moderate swelling, some gross instability, Some quadriceps weakness, Kellgren and Ayush at least grade 3    Right Knee - Full Range of Motion, No crepitation, Grossly neurovascularly intact, Good cap refill, No skin lesion, No swelling, No gross instability, No quadriceps weakness     Encounter Diagnoses     ICD-10-CM ICD-9-CM   1. Primary osteoarthritis of left knee  M17.12 715.16   2. Left knee pain, unspecified chronicity  M25.562 719.46       HPI:  The patient is here with a chief complaint of left knee pain, throbbing, burning pain. Pain is 9/10. She has had cortisone injection with no relief. X-rays are positive for severe OA. X-rays of the left knee are unremarkable for any hardware. Assessment/Plan:  I did talk to her about injection, she wants to wait. If she decides on knee replacement, she would just need general medical clearance. If the patient gets worse, she is to give me a call. No restrictions in the meantime. No cardiac history and no blood clot history, if she decides for her left knee. As part of continued conservative pain management options the patient was advised to utilize Tylenol or OTC NSAIDS as long as it is not medically contraindicated. Return to Office: Follow-up and Dispositions    Return for PRN. Scribed by Robby Tubbs as dictated by Skylar Stark. Lona Reeves MD.  Documentation True and Accepted Kike Reeves MD

## 2023-04-14 ENCOUNTER — TELEPHONE (OUTPATIENT)
Age: 73
End: 2023-04-14

## 2023-04-18 DIAGNOSIS — M25.562 LEFT KNEE PAIN, UNSPECIFIED CHRONICITY: ICD-10-CM

## 2023-04-18 DIAGNOSIS — Z01.818 PRE-OP TESTING: ICD-10-CM

## 2023-04-18 DIAGNOSIS — M17.12 UNILATERAL PRIMARY OSTEOARTHRITIS, LEFT KNEE: Primary | ICD-10-CM

## 2023-06-19 ENCOUNTER — OFFICE VISIT (OUTPATIENT)
Age: 73
End: 2023-06-19
Payer: MEDICARE

## 2023-06-19 VITALS
DIASTOLIC BLOOD PRESSURE: 76 MMHG | HEART RATE: 62 BPM | WEIGHT: 151 LBS | BODY MASS INDEX: 25.16 KG/M2 | SYSTOLIC BLOOD PRESSURE: 145 MMHG | OXYGEN SATURATION: 99 % | HEIGHT: 65 IN

## 2023-06-19 DIAGNOSIS — R94.31 ABNORMAL EKG: ICD-10-CM

## 2023-06-19 DIAGNOSIS — E78.00 PURE HYPERCHOLESTEROLEMIA, UNSPECIFIED: ICD-10-CM

## 2023-06-19 DIAGNOSIS — I10 ESSENTIAL (PRIMARY) HYPERTENSION: Primary | ICD-10-CM

## 2023-06-19 DIAGNOSIS — Z01.810 PREOP CARDIOVASCULAR EXAM: ICD-10-CM

## 2023-06-19 PROCEDURE — 93000 ELECTROCARDIOGRAM COMPLETE: CPT | Performed by: INTERNAL MEDICINE

## 2023-06-19 PROCEDURE — 1123F ACP DISCUSS/DSCN MKR DOCD: CPT | Performed by: INTERNAL MEDICINE

## 2023-06-19 PROCEDURE — 99214 OFFICE O/P EST MOD 30 MIN: CPT | Performed by: INTERNAL MEDICINE

## 2023-06-19 PROCEDURE — 3078F DIAST BP <80 MM HG: CPT | Performed by: INTERNAL MEDICINE

## 2023-06-19 PROCEDURE — 3074F SYST BP LT 130 MM HG: CPT | Performed by: INTERNAL MEDICINE

## 2023-06-19 ASSESSMENT — PATIENT HEALTH QUESTIONNAIRE - PHQ9
SUM OF ALL RESPONSES TO PHQ QUESTIONS 1-9: 0
1. LITTLE INTEREST OR PLEASURE IN DOING THINGS: 0
2. FEELING DOWN, DEPRESSED OR HOPELESS: 0
DEPRESSION UNABLE TO ASSESS: FUNCTIONAL CAPACITY MOTIVATION LIMITS ACCURACY
SUM OF ALL RESPONSES TO PHQ QUESTIONS 1-9: 0
SUM OF ALL RESPONSES TO PHQ9 QUESTIONS 1 & 2: 0

## 2023-06-19 ASSESSMENT — ENCOUNTER SYMPTOMS
EYES NEGATIVE: 1
SHORTNESS OF BREATH: 1
GASTROINTESTINAL NEGATIVE: 1

## 2023-06-19 NOTE — PROGRESS NOTES
Marion Thompson is a 81y.o. year old female. Follow-up of hypertension, hyperlipidemia    6/20 complains of high heart rate in 80s and 90s at times when she feels bad in the chest.  6/18 edema resolved on its own. Mild dyspnea on exertion persists. No chest pain  6/17 edema left leg for 2 months after [place] trip  6/19/2023 dyspnea on exertion going up 1 flight of steps. No chest pain, dizziness, edema, palpitations. Review of Systems   Constitutional: Negative. HENT: Negative. Eyes: Negative. Respiratory:  Positive for shortness of breath. Cardiovascular: Negative. Gastrointestinal: Negative. Endocrine: Negative. Genitourinary: Negative. Musculoskeletal: Negative. Neurological: Negative. Psychiatric/Behavioral: Negative. All other systems reviewed and are negative. Physical Exam  Vitals and nursing note reviewed. Constitutional:       Appearance: Normal appearance. HENT:      Head: Normocephalic and atraumatic. Nose: Nose normal.   Eyes:      Conjunctiva/sclera: Conjunctivae normal.   Cardiovascular:      Rate and Rhythm: Normal rate and regular rhythm. Pulses: Normal pulses. Heart sounds: Normal heart sounds. Pulmonary:      Effort: Pulmonary effort is normal.      Breath sounds: Normal breath sounds. Abdominal:      General: Bowel sounds are normal.      Palpations: Abdomen is soft. Musculoskeletal:         General: Normal range of motion. Right lower leg: No edema. Left lower leg: No edema. Skin:     General: Skin is warm and dry. Neurological:      General: No focal deficit present. Mental Status: She is alert and oriented to person, place, and time. Psychiatric:         Mood and Affect: Mood normal.         Behavior: Behavior normal.      Allergies   Allergen Reactions    Promethazine Other (See Comments)     Lethargy, slurred speech, hallucinations.        Past Medical History:   Diagnosis Date    Arthritis

## 2023-06-19 NOTE — PROGRESS NOTES
1. Have you been to the ER, urgent care clinic since your last visit? Hospitalized since your last visit? Yes, [facility]      2. Where do you normally have your labs drawn? [facility]    3. Do you need any refills today? No    4. Which local pharmacy do you use (enter pharmacy)? [facility]    5. Which mail order pharmacy do you use (enter pharmacy)? No     6. Are you here for surgical clearance and if so who will be doing your     procedure/surgery (care team)?    Yes,

## 2023-06-20 LAB — CREATININE, EXTERNAL: 1.03

## 2023-06-21 DIAGNOSIS — M25.562 LEFT KNEE PAIN, UNSPECIFIED CHRONICITY: ICD-10-CM

## 2023-06-21 DIAGNOSIS — Z01.818 PRE-OP TESTING: ICD-10-CM

## 2023-06-21 DIAGNOSIS — M17.12 UNILATERAL PRIMARY OSTEOARTHRITIS, LEFT KNEE: ICD-10-CM

## 2023-06-26 DIAGNOSIS — M25.562 LEFT KNEE PAIN, UNSPECIFIED CHRONICITY: ICD-10-CM

## 2023-06-26 DIAGNOSIS — Z01.818 PRE-OP TESTING: ICD-10-CM

## 2023-06-26 DIAGNOSIS — M17.12 UNILATERAL PRIMARY OSTEOARTHRITIS, LEFT KNEE: ICD-10-CM

## 2023-10-23 DIAGNOSIS — Z96.652 STATUS POST TOTAL LEFT KNEE REPLACEMENT: Primary | ICD-10-CM

## 2023-10-27 ENCOUNTER — OFFICE VISIT (OUTPATIENT)
Age: 73
End: 2023-10-27
Payer: MEDICARE

## 2023-10-27 DIAGNOSIS — M17.12 UNILATERAL PRIMARY OSTEOARTHRITIS, LEFT KNEE: Primary | ICD-10-CM

## 2023-10-27 PROCEDURE — 1123F ACP DISCUSS/DSCN MKR DOCD: CPT | Performed by: ORTHOPAEDIC SURGERY

## 2023-10-27 PROCEDURE — 99214 OFFICE O/P EST MOD 30 MIN: CPT | Performed by: ORTHOPAEDIC SURGERY

## 2023-10-27 RX ORDER — ONDANSETRON 8 MG/1
8 TABLET, ORALLY DISINTEGRATING ORAL EVERY 8 HOURS PRN
Qty: 20 TABLET | Refills: 0 | Status: SHIPPED | OUTPATIENT
Start: 2023-10-27 | End: 2023-11-03

## 2023-10-27 RX ORDER — OXYCODONE HYDROCHLORIDE AND ACETAMINOPHEN 5; 325 MG/1; MG/1
1 TABLET ORAL
Qty: 30 TABLET | Refills: 0 | Status: SHIPPED | OUTPATIENT
Start: 2023-10-27 | End: 2023-11-04

## 2023-10-27 RX ORDER — CEPHALEXIN 500 MG/1
500 CAPSULE ORAL EVERY 8 HOURS
Qty: 21 CAPSULE | Refills: 0 | Status: SHIPPED | OUTPATIENT
Start: 2023-10-27 | End: 2023-11-03

## 2023-10-27 RX ORDER — ENEMA 19; 7 G/133ML; G/133ML
3 ENEMA RECTAL
Refills: 0 | COMMUNITY
Start: 2023-10-27 | End: 2023-10-27

## 2023-10-27 NOTE — PROGRESS NOTES
refill, No skin lesion, No swelling, No gross instability, No quadriceps weakness     Inpatient status: The patient has admitted to severe pain in the affected knee and due to such pain they are unable to complete activities of daily living at home and/or work on a regular basis where conservative treatments have failed. After extensive discussion with the patient, they have chosen to receive a total knee replacement with the expectation of inpatient procedure. Their dependent functional status (i.e. lack of capable support and safety at home, pain management, comorbities, or difficulty ambulating with assistive walking devices) would deem them a candidate for an inpatient stay. The patient acknowledges and understand the plan. The risks of surgery were explained to the patient which include but not limited to infection, nerve injury, artery injury, tendon injury, poor result, poor wound healing, unforeseen incidence, bleeding, infection, nerve damage, failure to improve, worsening of symptoms, morbidity, and mortality risks were explained. All questions were answered. Patient was told of no guarantees. Patient accepts all risks and benefits. A consent for surgery will be documented and signed by the patient or a legal guardian. All questions were answered. The procedure was explained in detail. The patient was counseled about the risks of kevin Covid-19 during their perioperative period and any recovery window from their procedure. The patient was made aware that kevin Covid-19 may worsen their prognosis for recovering from their procedure and lend to a higher morbidity and/or mortality risk. All material risks, benefits, and reasonable alternatives including postponing the procedure were discussed. The patient DOES wish to proceed with their procedure at this time.     Visit Diagnoses         Codes    Unilateral primary osteoarthritis, left knee    -  Primary M17.12               HPI:  The

## 2023-11-02 ENCOUNTER — HOSPITAL ENCOUNTER (OUTPATIENT)
Facility: HOSPITAL | Age: 73
Setting detail: RECURRING SERIES
Discharge: HOME | End: 2023-11-05
Payer: MEDICARE

## 2023-11-02 PROCEDURE — 97162 PT EVAL MOD COMPLEX 30 MIN: CPT

## 2023-11-02 NOTE — PROGRESS NOTES
PT DAILY TREATMENT NOTE/Hip/Knee/Ankle EVAL 10-18    Patient Name: Riley Rogel    Date: 2023    : 1950  Insurance: Payor: Deana Rachel / Plan: Emiliano Quiroz PLUS / Product Type: *No Product type* /      Patient  verified yes     Visit #   Current / Total 1 27   Time   In / Out 12:08 12:51   Pain   In / Out 7 7   Subjective Functional Status/Changes: See below   Changes to:  Meds, Allergies, Med Hx, Sx Hx?   If yes, update Summary List yes       TREATMENT AREA =  Pain in left knee [M25.562]  Presence of left artificial knee joint [Z96.652]      SUBJECTIVE  Any medication changes, allergies to medications, adverse drug reactions, diagnosis change, or new procedure performed?: [x] No    [] Yes (see summary sheet for update)  Subjective functional status/changes:     Current symptoms/Complaints: left knee pain  Mechanism of Injury: 2 days s/p left TKR due to OA and pain    PLOF: functionally independent, no AD, enjoys cooking- standing all day  Limitations to PLOF: ambulating with RW, pain with any Wbing including difficulty cooking, dificulty sleeping due to pain, difficulty with most ADLs including  STS and getting into/out of bed    Pain Level (0-10 scale):   [x]constant []intermittent []improving []worsening []no change since onset  Current: 7/10  Best: 10 during rest  Worst: 10/10 during WBing  Sleep: is interrupted secondary to pain    Previous Treatment/Compliance: not compliant with HEP from hospital  PMHx/Surgical Hx: bladder surgery- March; right TKR in ; hypertension; thyroid  Work Hx: retried  Living Situation: lives with , no stairs needs at home  Pt Goals: \"be able to walk independently and be able to stand and cook\"  Barriers: []pain []financial []time []transportation []other  Motivation: good  Substance use: []Alcohol []Tobacco []other:   Cognition: A & O x 3        OBJECTIVE    39 min [x]Eval - untimed                             Therapeutic

## 2023-11-02 NOTE — PROGRESS NOTES
[facility], [address] [place] [address] Drive  Phone: [phone]   Fax: [phone]    Plan of Care/ Statement of Necessity for Physical Therapy Services        Patient name: Rosy Lema Start of Care: 2023   Referral source: Felicitas Cody MD : 1950    Medical Diagnosis: Pain in left knee [M25.562]  Presence of left artificial knee joint [Z96.652]      Onset Date:10/31/23    Treatment Diagnosis:  M25.562  LEFT KNEE PAIN                                           Prior Hospitalization: see medical history Provider#: [id number]   Medications: Verified on Patient Summary List     Comorbidities:  bladder surgery- March; right TKR in ; hypertension; thyroid  Prior Level of Function: functionally independent, no AD, enjoys cooking- standing all day      The Plan of Care and following information is based on the information from the initial evaluation. Assessment/ key information: Patient is a 73 yo female who presents to [facility] with c/o left knee pain. Patient is s/p left TKR on 10/31/23. Patient is ambulatory and objectively appears appropriate for OP PT despite high pain ratings. Patient's impairments include pain, limited knee ROM, impaired LE strength, edema, and impaired coordination contributing to impaired transfers, balance, and gait. Patient reports decreased PLOF including ambulation with AD, pain and difficulty with transfers, and inability to stand to cook meals. Patient demonstrates decreased ROM, decreased strength, impaired posture, impaired gait mechancis, pain and decreased functional mobility tolerance.   Evaluation Complexity HistoryMEDIUM  Complexity : 1-2 comorbidities / personal factors will impact the outcome/ POC  ; Examination HIGH Complexity : 4+ Standardized tests and measures addressing body structure, function, activity limitation and / or participation in recreation  ;Presentation MEDIUM Complexity : Evolving with changing

## 2023-11-03 ENCOUNTER — HOSPITAL ENCOUNTER (OUTPATIENT)
Facility: HOSPITAL | Age: 73
Setting detail: RECURRING SERIES
Discharge: HOME | End: 2023-11-06
Payer: MEDICARE

## 2023-11-03 ENCOUNTER — TELEPHONE (OUTPATIENT)
Age: 73
End: 2023-11-03

## 2023-11-03 PROCEDURE — 97112 NEUROMUSCULAR REEDUCATION: CPT

## 2023-11-03 PROCEDURE — 97016 VASOPNEUMATIC DEVICE THERAPY: CPT

## 2023-11-03 PROCEDURE — 97530 THERAPEUTIC ACTIVITIES: CPT

## 2023-11-03 PROCEDURE — 97110 THERAPEUTIC EXERCISES: CPT

## 2023-11-03 RX ORDER — CLINDAMYCIN HYDROCHLORIDE 300 MG/1
300 CAPSULE ORAL EVERY 6 HOURS
Qty: 28 CAPSULE | Refills: 0 | Status: SHIPPED | OUTPATIENT
Start: 2023-11-03 | End: 2023-11-03

## 2023-11-03 NOTE — TELEPHONE ENCOUNTER
LT TKR 10/31/23    Patient is expressing concern about her antibiotic stating it is causing diarrhea, she has tried [employer] and isn't sure if she should try Imodium or not. She is requesting a different antibiotic.     Pharmacy: [facility] DR KARAN WOODY [address] [place] [address], [address] [place] [address] Marychuy Malhotra

## 2023-11-03 NOTE — PROGRESS NOTES
PHYSICAL / OCCUPATIONAL THERAPY - DAILY TREATMENT NOTE (updated )    Patient Name: Janiya Stark    Date: 11/3/2023    : 1950  Insurance: Payor: [address] / Plan: Jenetta Hashimoto PLUS / Product Type: *No Product type* /      Patient  verified Yes     Visit #   Current / Total 2 12   Time   In / Out 1010 1110   Pain   In / Out 8 8   Subjective Functional Status/Changes: \"The ace wrap with the compression stocking makes the leg feel real tight and uncomfortable. \"   Changes to:  Meds, Allergies, Med Hx, Sx Hx? If yes, update Summary List no       TREATMENT AREA =  Pain in left knee [M25.562]  Presence of left artificial knee joint [Z96.652]    OBJECTIVE  Modalities Rationale:     decrease edema and decrease pain to improve patient's ability to Complete ADLS  10 min [x]  Vasopneumatic Device, press/temp: Medium, low      If using vaso (need to measure limb vaso being performed on)      pre-treatment girth : 46.2 cm. (With ace wrap)      post-treatment girth : 45.6 cm.      measured at (landmark location): mid-patella       min []  Other:    [x] Skin assessment post-treatment (if applicable):    [x]  intact    []  redness- no adverse reaction     []redness - adverse reaction:        Therapeutic Procedures: Tx Min Billable or 1:1 Min (if diff from Tx Min) Procedure, Rationale, Specifics   25 20 [id number] Therapeutic Exercise (timed):  increase ROM, strength, coordination, balance, and proprioception to improve patient's ability to progress to PLOF and address remaining functional goals. (see flow sheet as applicable)     Details if applicable:       15  [id number] Therapeutic Activity (timed):  use of dynamic activities replicating functional movements to increase ROM, strength, coordination, balance, and proprioception in order to improve patient's ability to progress to PLOF and address remaining functional goals.   (see flow sheet as applicable)     Details if applicable:     10  05078

## 2023-11-06 ENCOUNTER — APPOINTMENT (OUTPATIENT)
Facility: HOSPITAL | Age: 73
End: 2023-11-06
Payer: MEDICARE

## 2023-11-06 ENCOUNTER — TELEPHONE (OUTPATIENT)
Facility: HOSPITAL | Age: 73
End: 2023-11-06
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
## 2023-11-07 ENCOUNTER — TELEMEDICINE (OUTPATIENT)
Age: 73
End: 2023-11-07

## 2023-11-07 DIAGNOSIS — Z96.652 STATUS POST TOTAL KNEE REPLACEMENT, LEFT: Primary | ICD-10-CM

## 2023-11-07 RX ORDER — METHYLPREDNISOLONE 4 MG/1
TABLET ORAL
Qty: 1 KIT | Refills: 0 | Status: SHIPPED | OUTPATIENT
Start: 2023-11-07

## 2023-11-07 RX ORDER — TRAMADOL HYDROCHLORIDE 50 MG/1
50 TABLET ORAL EVERY 6 HOURS PRN
Qty: 30 TABLET | Refills: 0 | Status: SHIPPED | OUTPATIENT
Start: 2023-11-07 | End: 2023-11-15

## 2023-11-07 RX ORDER — CEPHALEXIN 500 MG/1
500 CAPSULE ORAL 4 TIMES DAILY
Qty: 28 CAPSULE | Refills: 0 | Status: SHIPPED | OUTPATIENT
Start: 2023-11-07 | End: 2023-11-14

## 2023-11-07 NOTE — PATIENT INSTRUCTIONS
Post Operative Total Knee Replacement Instructions    PLEASE REMOVE YOUR LONG WHITE BANDAGE & STOCKING PRIOR TO CONNECTING TO YOUR APPOINTMENT       During your recovery from a total knee replacement, you will be participating in an OUTPATIENT physical therapy program. Your goal is to progress from a walker to a cane to nothing at all while walking, if possible, over the next 2 weeks. You can now shower and get your incision wet, pat it dry afterwards. No further dressing changes will be required as long as there is no drainage. You may take a bath 3 weeks post surgery as long as there is no drainage from your incision. You may drive if you are not using any assistive devices to walk and are not using any narcotic pain medication. You may discontinue your aspirin (if that is your primary blood thinner prescribed by Dr. Kwabena Palm ) when you are at least 4 weeks out from surgery and are no longer using a cane or walker. If you are still using assistive devices, please DO NOT stop the aspirin until you are completely off them. If you are on other blood thinners prescribed by another doctor please continue that until you are instructed to discontinue them. You and your physical therapist will determine when to stop your physical therapy program.    Narcotic pain medication can cause constipation. You may take over the counter stool softeners such as Docusate Sodium or Miralax 1-2 times per day to assist with the constipation. Ensure you are taking in plenty of fluids and fiber as well. If you require a refill on a narcotic pain medication, please let Dr. Kwabena Palm or his Nurse Practitioner know at your appointment today or AT LEAST 48 hours prior to needing it. No refills will be provided after hours or during weekends. If you experience any significant calf pain, swelling, or shortness of breath, please call our office immediately or go to the nearest emergency room.     If you notice any significant

## 2023-11-07 NOTE — PROGRESS NOTES
Lashanda Oneill (:  1950) is a Established patient, evaluated via telephone on 2023    [id number] [facility],[address], [address] [phone]  Dept: [phone]  Dept Fax: [phone]   Chief Complaint   Patient presents with    Post-Op Check    Knee Pain     Patient-Reported Vitals  No data recorded   Allergies   Allergen Reactions    Promethazine Other (See Comments)     Lethargy, slurred speech, hallucinations. Current Outpatient Medications   Medication Sig Dispense Refill    traMADol (ULTRAM) 50 MG tablet Take 1 tablet by mouth every 6 hours as needed for Pain for up to 8 days.  Max Daily Amount: 200 mg 30 tablet 0    methylPREDNISolone (MEDROL DOSEPACK) 4 MG tablet Take by mouth Per Dose pack instructions 1 kit 0    cephALEXin (KEFLEX) 500 MG capsule Take 1 capsule by mouth 4 times daily for 7 days 28 capsule 0    amLODIPine (NORVASC) 5 MG tablet Take 1 tablet by mouth daily      atorvastatin (LIPITOR) 10 MG tablet Take 1 tablet by mouth daily      carvedilol (COREG) 12.5 MG tablet TAKE 1/2 (ONE-HALF) TABLET BY MOUTH IN THE MORNING AND 1 IN THE EVENING      vitamin D3 (CHOLECALCIFEROL) 125 MCG (5000 UT) TABS tablet Take 1 tablet by mouth daily      Cyanocobalamin 1000 MCG SUBL Take 1,000 mcg by mouth daily      estradiol (ESTRACE) 0.1 MG/GM vaginal cream Apply to vaginal vault with fingers daily      ferrous sulfate (IRON 325) 325 (65 Fe) MG tablet Take by mouth every morning (before breakfast)      levothyroxine (SYNTHROID) 50 MCG tablet TAKE 1 TABLET BY MOUTH ONCE DAILY IN THE MORNING AT 6AM ON EMPTY STOMACH      nitroGLYCERIN (NITROSTAT) 0.4 MG SL tablet Place 1 tablet under the tongue      pantoprazole (PROTONIX) 40 MG tablet Take 1 tablet by mouth daily      valsartan (DIOVAN) 160 MG tablet TAKE 1 TABLET BY MOUTH TWICE DAILY FOR HIGH BLOOD PRESSURE       No current facility-administered medications for

## 2023-11-08 ENCOUNTER — HOSPITAL ENCOUNTER (OUTPATIENT)
Facility: HOSPITAL | Age: 73
Setting detail: RECURRING SERIES
Discharge: HOME | End: 2023-11-11
Payer: MEDICARE

## 2023-11-08 PROCEDURE — 97110 THERAPEUTIC EXERCISES: CPT

## 2023-11-08 PROCEDURE — 97016 VASOPNEUMATIC DEVICE THERAPY: CPT

## 2023-11-08 PROCEDURE — 97530 THERAPEUTIC ACTIVITIES: CPT

## 2023-11-08 PROCEDURE — 97112 NEUROMUSCULAR REEDUCATION: CPT

## 2023-11-08 NOTE — PROGRESS NOTES
Jesse Mccray, PT [facility]   11/20/2023 12:10 PM Teresita Potter, PT [facility]   11/21/2023 10:50 AM Sherol Limcira, PT [facility]   11/27/2023 10:50 AM Teresita Potter, PT [facility]   11/29/2023 10:50 AM Sherol Limes, PT [facility]   12/1/2023 11:30 AM Sherol Limes, PT [facility]   12/4/2023 11:15 AM Iris Robles MD [facility]

## 2023-11-10 ENCOUNTER — HOSPITAL ENCOUNTER (OUTPATIENT)
Facility: HOSPITAL | Age: 73
Setting detail: RECURRING SERIES
Discharge: HOME | End: 2023-11-13
Payer: MEDICARE

## 2023-11-10 PROCEDURE — 97016 VASOPNEUMATIC DEVICE THERAPY: CPT

## 2023-11-10 PROCEDURE — 97530 THERAPEUTIC ACTIVITIES: CPT

## 2023-11-10 PROCEDURE — 97110 THERAPEUTIC EXERCISES: CPT

## 2023-11-10 PROCEDURE — 97112 NEUROMUSCULAR REEDUCATION: CPT

## 2023-11-10 NOTE — PROGRESS NOTES
PHYSICAL / OCCUPATIONAL THERAPY - DAILY TREATMENT NOTE (updated )    Patient Name: Tisha Aragon    Date: 11/10/2023    : 1950  Insurance: Payor: Nayeli Marker / Plan: Lily Rolle PLUS / Product Type: *No Product type* /      Patient  verified Yes     Visit #   Current / Total 4 12   Time   In / Out 2:45 pm 3:50 pm   Pain   In / Out 7 6   Subjective Functional Status/Changes: Pt states she sent a picture of her incision to her surgeon just to keep them apprised as to her incision and her reaction previously this week. States she does feel a little better today. Changes to:  Meds, Allergies, Med Hx, Sx Hx? If yes, update Summary List no      TREATMENT AREA =  Pain in left knee [M25.562]  Presence of left artificial knee joint [Z96.652]    OBJECTIVE  Modalities Rationale:     decrease edema and decrease pain to improve patient's ability to Complete ADLS  10  min [x]  Vasopneumatic Device, press/temp: none, low to left knee in supine post treatment with LLE elevated on wedge      If using vaso (need to measure limb vaso being performed on)      pre-treatment girth : 45.2 cm      post-treatment girth : 43.4 cm      measured at (landmark location): mid-patella       min []  Other:    [x] Skin assessment post-treatment (if applicable):    [x]  intact    []  redness- no adverse reaction     []redness - adverse reaction:      *performed with no pressure to limit stress on incision given blistering and erythema. Therapeutic Procedures: Tx Min Billable or 1:1 Min (if diff from Tx Min) Procedure, Rationale, Specifics   30 20 57699 Therapeutic Exercise (timed):  increase ROM, strength, coordination, balance, and proprioception to improve patient's ability to progress to PLOF and address remaining functional goals.  (see flow sheet as applicable)     Details if applicable:       15 10 99472 Therapeutic Activity (timed):  use of dynamic activities replicating functional movements to

## 2023-11-13 ENCOUNTER — OFFICE VISIT (OUTPATIENT)
Age: 73
End: 2023-11-13

## 2023-11-13 DIAGNOSIS — Z96.652 STATUS POST TOTAL KNEE REPLACEMENT, LEFT: Primary | ICD-10-CM

## 2023-11-13 PROCEDURE — 99024 POSTOP FOLLOW-UP VISIT: CPT | Performed by: ORTHOPAEDIC SURGERY

## 2023-11-13 RX ORDER — TRAMADOL HYDROCHLORIDE 50 MG/1
50 TABLET ORAL EVERY 6 HOURS PRN
Qty: 30 TABLET | Refills: 0 | Status: SHIPPED | OUTPATIENT
Start: 2023-11-13 | End: 2023-11-21

## 2023-11-13 NOTE — PROGRESS NOTES
Name: Ann Rios    : 1950     [facility] [address] [facility]  [address] [place] [address], [address] [place] [phone]  Dept: [phone]  Dept Fax: [phone]     Chief Complaint   Patient presents with    Knee Pain        There were no vitals taken for this visit. Allergies   Allergen Reactions    Promethazine Other (See Comments)     Lethargy, slurred speech, hallucinations. Current Outpatient Medications   Medication Sig Dispense Refill    triamcinolone (KENALOG) 0.1 % cream APPLY A THIN LAYER TO THE AFFECTED AREA(S) BY TOPICAL ROUTE 2 TIMES PER DAY FOR 7-10 DAYS 15 g 0    traMADol (ULTRAM) 50 MG tablet Take 1 tablet by mouth every 6 hours as needed for Pain for up to 8 days.  Max Daily Amount: 200 mg 30 tablet 0    methylPREDNISolone (MEDROL DOSEPACK) 4 MG tablet Take by mouth Per Dose pack instructions 1 kit 0    cephALEXin (KEFLEX) 500 MG capsule Take 1 capsule by mouth 4 times daily for 7 days 28 capsule 0    amLODIPine (NORVASC) 5 MG tablet Take 1 tablet by mouth daily      atorvastatin (LIPITOR) 10 MG tablet Take 1 tablet by mouth daily      carvedilol (COREG) 12.5 MG tablet TAKE 1/2 (ONE-HALF) TABLET BY MOUTH IN THE MORNING AND 1 IN THE EVENING      vitamin D3 (CHOLECALCIFEROL) 125 MCG (5000 UT) TABS tablet Take 1 tablet by mouth daily      Cyanocobalamin 1000 MCG SUBL Take 1,000 mcg by mouth daily      estradiol (ESTRACE) 0.1 MG/GM vaginal cream Apply to vaginal vault with fingers daily      ferrous sulfate (IRON 325) 325 (65 Fe) MG tablet Take by mouth every morning (before breakfast)      levothyroxine (SYNTHROID) 50 MCG tablet TAKE 1 TABLET BY MOUTH ONCE DAILY IN THE MORNING AT 6AM ON EMPTY STOMACH      nitroGLYCERIN (NITROSTAT) 0.4 MG SL tablet Place 1 tablet under the tongue      pantoprazole (PROTONIX) 40 MG tablet Take 1 tablet by mouth daily      valsartan (DIOVAN) 160 MG tablet TAKE 1 TABLET BY MOUTH TWICE DAILY FOR

## 2023-11-14 ENCOUNTER — HOSPITAL ENCOUNTER (OUTPATIENT)
Facility: HOSPITAL | Age: 73
Setting detail: RECURRING SERIES
Discharge: HOME | End: 2023-11-17
Payer: MEDICARE

## 2023-11-14 PROCEDURE — 97016 VASOPNEUMATIC DEVICE THERAPY: CPT

## 2023-11-14 PROCEDURE — 97530 THERAPEUTIC ACTIVITIES: CPT

## 2023-11-14 PROCEDURE — 97110 THERAPEUTIC EXERCISES: CPT

## 2023-11-14 PROCEDURE — 97112 NEUROMUSCULAR REEDUCATION: CPT

## 2023-11-14 NOTE — PROGRESS NOTES
PHYSICAL / OCCUPATIONAL THERAPY - DAILY TREATMENT NOTE (updated )    Patient Name: Mayra Frausto    Date: 2023    : 1950  Insurance: Payor: Bibi Mcnulty / Plan: Tavo Titus PLUS / Product Type: *No Product type* /      Patient  verified Yes     Visit #   Current / Total 5 12   Time   In / Out 1655 1745   Pain   In / Out 6 4   Subjective Functional Status/Changes: Patient reports that she has a little less pain today. She states that she was prescribed a    Changes to:  Meds, Allergies, Med Hx, Sx Hx? If yes, update Summary List no      TREATMENT AREA =  Pain in left knee [M25.562]  Presence of left artificial knee joint [Z96.652]    OBJECTIVE  Modalities Rationale:     decrease edema and decrease pain to improve patient's ability to Complete ADLS  10  min [x]  Vasopneumatic Device, press/temp: none, low to left knee in supine post treatment with LLE elevated on wedge      If using vaso (need to measure limb vaso being performed on)      pre-treatment girth : 44.6 cm      post-treatment girth : 43.0 cm      measured at (landmark location): mid-patella       min []  Other:    [x] Skin assessment post-treatment (if applicable):    [x]  intact    []  redness- no adverse reaction     []redness - adverse reaction:      *performed with no pressure to limit stress on incision given blistering and erythema. Therapeutic Procedures: Tx Min Billable or 1:1 Min (if diff from Tx Min) Procedure, Rationale, Specifics   20  39327 Therapeutic Exercise (timed):  increase ROM, strength, coordination, balance, and proprioception to improve patient's ability to progress to PLOF and address remaining functional goals.  (see flow sheet as applicable)     Details if applicable:       10  29969 Therapeutic Activity (timed):  use of dynamic activities replicating functional movements to increase ROM, strength, coordination, balance, and proprioception in order to improve patient's ability to

## 2023-11-16 ENCOUNTER — HOSPITAL ENCOUNTER (OUTPATIENT)
Facility: HOSPITAL | Age: 73
Setting detail: RECURRING SERIES
Discharge: HOME | End: 2023-11-19
Payer: MEDICARE

## 2023-11-16 PROCEDURE — 97530 THERAPEUTIC ACTIVITIES: CPT

## 2023-11-16 PROCEDURE — 97112 NEUROMUSCULAR REEDUCATION: CPT

## 2023-11-16 PROCEDURE — 97016 VASOPNEUMATIC DEVICE THERAPY: CPT

## 2023-11-16 PROCEDURE — 97110 THERAPEUTIC EXERCISES: CPT

## 2023-11-16 NOTE — PROGRESS NOTES
level of function. Eval Status: 10/10 at worst     5. Pt will demonstrate 12x STS in 30 second STS to match age- norm group for transfer ability and LE functional strength. Eval Status: inability to complete due to post-op status, difficulty with all transfers     6. Pt will demonstrate ability to complete 12 steps with normal step over step pattern safely to acess top floor of home.         Eval status: unable due to post-op status      PLAN  Yes  Continue plan of care  []  Upgrade activities as tolerated  []  Discharge due to:   []  Other:    Cierra Lopez, PT    11/16/2023    12:17 PM    Future Appointments   Date Time Provider [address]   11/20/2023 12:10 PM Teo Marie, PT [facility]   11/21/2023 10:50 AM Thierno Wilks, PT [facility]   11/27/2023 10:50 AM Teo Marie, PT [facility]   11/29/2023 10:50 AM Thierno Efe, PT [facility]   12/1/2023 11:30 AM Thierno Efe, PT [facility]   12/4/2023 11:15 AM Marybeth Azevedo MD [facility]   12/5/2023  3:15 PM Hash, Meryle Lieu, APRN - [facility]

## 2023-11-20 ENCOUNTER — HOSPITAL ENCOUNTER (OUTPATIENT)
Facility: HOSPITAL | Age: 73
Setting detail: RECURRING SERIES
Discharge: HOME | End: 2023-11-23
Payer: MEDICARE

## 2023-11-20 PROCEDURE — 97530 THERAPEUTIC ACTIVITIES: CPT

## 2023-11-20 PROCEDURE — 97112 NEUROMUSCULAR REEDUCATION: CPT

## 2023-11-20 PROCEDURE — 97016 VASOPNEUMATIC DEVICE THERAPY: CPT

## 2023-11-20 PROCEDURE — 97110 THERAPEUTIC EXERCISES: CPT

## 2023-11-20 NOTE — PROGRESS NOTES
worst to improve sleeping tolerance and restore prior level of function. Eval Status: 10/10 at worst     5. Pt will demonstrate 12x STS in 30 second STS to match age- norm group for transfer ability and LE functional strength. Eval Status: inability to complete due to post-op status, difficulty with all transfers     6. Pt will demonstrate ability to complete 12 steps with normal step over step pattern safely to acess top floor of home.         Eval status: unable due to post-op status      PLAN  Yes  Continue plan of care  []  Upgrade activities as tolerated  []  Discharge due to:   []  Other:    José Hall, PT    11/20/2023    12:45 PM    Future Appointments   Date Time Provider [address]   11/21/2023 10:50 AM Darla Cooks, PT [facility]   11/27/2023 10:50 AM José Hall PT [facility]   11/29/2023 10:50 AM Darla Cooks, PT [facility]   12/1/2023 11:30 AM Darla Cooks, PT [facility]   12/4/2023 11:15 AM Abril Guevara MD [facility]   12/5/2023  3:15 PM hSaron Cutler APRN - CNP [facility]

## 2023-11-21 ENCOUNTER — HOSPITAL ENCOUNTER (OUTPATIENT)
Facility: HOSPITAL | Age: 73
Setting detail: RECURRING SERIES
Discharge: HOME | End: 2023-11-24
Payer: MEDICARE

## 2023-11-21 PROCEDURE — 97016 VASOPNEUMATIC DEVICE THERAPY: CPT

## 2023-11-21 PROCEDURE — 97530 THERAPEUTIC ACTIVITIES: CPT

## 2023-11-21 PROCEDURE — 97110 THERAPEUTIC EXERCISES: CPT

## 2023-11-21 PROCEDURE — 97112 NEUROMUSCULAR REEDUCATION: CPT

## 2023-11-21 NOTE — PROGRESS NOTES
address remaining functional goals. (see flow sheet as applicable)     Details if applicable:     15 15 79822 Neuromuscular Re-Education (timed):  improve balance, coordination, kinesthetic sense, posture, core stability and proprioception to improve patient's ability to develop conscious control of individual muscles and awareness of position of extremities in order to progress to PLOF and address remaining functional goals. (see flow sheet as applicable)     Details if applicable:     48 40 [facility] Totals Reminder: bill using total billable min of TIMED therapeutic procedures (example: do not include dry needle or estim unattended, both untimed codes, in totals to left)  8-22 min = 1 unit; 23-37 min = 2 units; 38-52 min = 3 units; 53-67 min = 4 units; 68-82 min = 5 units   Total Total       TOTAL TREATMENT TIME:     63       [x]  Patient Education billed concurrently with other procedures   [x] Review HEP    [] Progressed/Changed HEP, detail:    [] Other detail:       Objective Information/Functional Measures/Assessment    Knee ext AROM 1 degrees from neutral    Skin inspection: dermatitis appears to be resolving, skin color returning to normal. Incision appears to be closing well void of signs of local infection. Pt tolerated treatment well demonstrating improved ease of stair negotiation on low steps though some mild eccentric quad control deficits with downward stair negotiation requiing minimal UE assistance.      Patient will continue to benefit from skilled PT services to modify and progress therapeutic interventions, analyze and address functional mobility deficits, analyze and address ROM deficits, analyze and address strength deficits, analyze and address soft tissue restrictions, analyze and cue for proper movement patterns, analyze and modify for postural abnormalities, analyze and address imbalance/dizziness, and instruct in home and community integration to address functional deficits and attain

## 2023-11-27 ENCOUNTER — HOSPITAL ENCOUNTER (OUTPATIENT)
Facility: HOSPITAL | Age: 73
Setting detail: RECURRING SERIES
Discharge: HOME | End: 2023-11-30
Payer: MEDICARE

## 2023-11-27 PROCEDURE — 97530 THERAPEUTIC ACTIVITIES: CPT

## 2023-11-27 PROCEDURE — 97016 VASOPNEUMATIC DEVICE THERAPY: CPT

## 2023-11-27 PROCEDURE — 97112 NEUROMUSCULAR REEDUCATION: CPT

## 2023-11-27 PROCEDURE — 97110 THERAPEUTIC EXERCISES: CPT

## 2023-11-27 NOTE — PROGRESS NOTES
PHYSICAL / OCCUPATIONAL THERAPY - DAILY TREATMENT NOTE (updated )    Patient Name: Consuelo Shukla    Date: 2023    : 1950  Insurance: Payor: Shemar Rogel / Plan: Michaela July PLUS / Product Type: *No Product type* /      Patient  verified Yes     Visit #   Current / Total 9 12   Time   In / Out 1050 1150   Pain   In / Out 4 3   Subjective Functional Status/Changes: Patient reports that she had a nice holiday. She reports no new changes since last visit. Changes to:  Meds, Allergies, Med Hx, Sx Hx? If yes, update Summary List no      TREATMENT AREA =  Pain in left knee [M25.562]  Presence of left artificial knee joint [Z96.652]    OBJECTIVE  Modalities Rationale:     decrease edema and decrease pain to improve patient's ability to Complete ADLS  10 min [x]  Vasopneumatic Device, press/temp: none, low to left knee in supine post treatment with LLE elevated on wedge      If using vaso (need to measure limb vaso being performed on)      pre-treatment girth : 41.9 cm      post-treatment girth 41.7cm      measured at (landmark location): mid-patella      [x] Skin assessment post-treatment (if applicable):    [x]  intact    []  redness- no adverse reaction     []redness - adverse reaction:      *performed with no pressure to limit stress on incision given blistering and erythema. Therapeutic Procedures: Tx Min Billable or 1:1 Min (if diff from Tx Min) Procedure, Rationale, Specifics   20  63545 Therapeutic Exercise (timed):  increase ROM, strength, coordination, balance, and proprioception to improve patient's ability to progress to PLOF and address remaining functional goals.  (see flow sheet as applicable)     Details if applicable:       20  40704 Therapeutic Activity (timed):  use of dynamic activities replicating functional movements to increase ROM, strength, coordination, balance, and proprioception in order to improve patient's ability to progress to PLOF and

## 2023-11-29 ENCOUNTER — HOSPITAL ENCOUNTER (OUTPATIENT)
Facility: HOSPITAL | Age: 73
Setting detail: RECURRING SERIES
Discharge: HOME | End: 2023-12-02
Payer: MEDICARE

## 2023-11-29 PROCEDURE — 97110 THERAPEUTIC EXERCISES: CPT

## 2023-11-29 PROCEDURE — 97530 THERAPEUTIC ACTIVITIES: CPT

## 2023-11-29 PROCEDURE — 97535 SELF CARE MNGMENT TRAINING: CPT

## 2023-11-29 PROCEDURE — 97016 VASOPNEUMATIC DEVICE THERAPY: CPT

## 2023-11-29 NOTE — PROGRESS NOTES
[facility] at [facility], [address] [place] [address] Drive  Phone: [phone]   Fax: [phone]    Physical Therapy Progress Note  Patient name: Nayana Montalvo Start of Care: 2023   Referral source: Sukh Krishna MD : 1950   Medical/Treatment Diagnosis: Pain in left knee [M25.562]  Presence of left artificial knee joint [Z96.652] Onset Date:10/31/2023   Prior Hospitalization: see medical history Provider#: [id number]   Medications: Verified on Patient Summary List    Comorbidities:  bladder surgery- March; right TKR in ; hypertension; thyroid  Prior Level of Function: functionally independent, no AD, enjoys cooking- standing all day    Visits from Start of Care: 10    Missed Visits: 0    Updated Goals/Measure of Progress:   Short Term Goals: To be accomplished in 6 weeks}:  Patient will be independent and compliant with HEP to progress toward goals and restore functional mobility. Eval Status: issued at [facility]  Current: pt reports daily compliance with HEP up to 2x a day 23 Progressing     Patient will demonstrate ability to ambulate 1000 feet with LRAD and normalized gait pattern for community ambulation including doctor appointments and grocery shopping. Eval Status: Gait: utilizing RW, stiff knee with reduced knee flexion, minimal heel to toe, hip in ER, step through pattern with reduced step lengths   Current: pt able to amb without AD with step through gait pattern but, demonstrates decreased heel toe mechanics with general stiff knee gait mechanics and decreased knee flexion during swing phase 23 Progressing     Pt will demonstrate left knee extension AROM and PROM to 0 degrees extension to improve gait mechanics and standing tolerance for cooking.    Eval Status:                                          AROM                                  PROM              Knee Left Right Left Right   Extension Lacks 4 degrees 0 Lacks 6 degrees 0              Current:
Measures/Assessment    Pt has attended 10 visits of physical therapy for Left Knee Pain secondary to Left TKR on 10/31/23. Pt has demonstrate good progress towards goals established at [facility]. Pt demonstrates improved general ROM as well as mild improvements with strength. Pt initially demonstrated difficulty with gait mechanics, demonstrating mild circumduction compensation and decreased knee flexion during swing phase. Pt will benefit from continued therapy to address unmet goals and to return to prior level of activity. Patient will continue to benefit from skilled PT / OT services to modify and progress therapeutic interventions, analyze and address functional mobility deficits, analyze and address ROM deficits, analyze and address strength deficits, analyze and address soft tissue restrictions, analyze and cue for proper movement patterns, and analyze and modify for postural abnormalities to address functional deficits and attain remaining goals. Progress toward goals / Updated goals:  []  See Progress Note/Recertification       Short Term Goals: To be accomplished in 6 weeks}:  Patient will be independent and compliant with HEP to progress toward goals and restore functional mobility. [facility] Status: issued at [facility]  Current: pt reports daily compliance with HEP up to 2x a day 11/29/23 Progressing     Patient will demonstrate ability to ambulate 1000 feet with LRAD and normalized gait pattern for community ambulation including doctor appointments and grocery shopping.    [facility] Status: Gait: utilizing RW, stiff knee with reduced knee flexion, minimal heel to toe, hip in ER, step through pattern with reduced step lengths   Current: pt able to amb without AD with step through gait pattern but, demonstrates decreased heel toe mechanics with general stiff knee gait mechanics and decreased knee flexion during swing phase 11/29/23 Progressing    Pt will demonstrate left knee extension AROM and PROM to 0 degrees

## 2023-12-01 ENCOUNTER — HOSPITAL ENCOUNTER (OUTPATIENT)
Facility: HOSPITAL | Age: 73
Setting detail: RECURRING SERIES
Discharge: HOME | End: 2023-12-04
Payer: MEDICARE

## 2023-12-01 PROCEDURE — 97535 SELF CARE MNGMENT TRAINING: CPT

## 2023-12-01 PROCEDURE — 97016 VASOPNEUMATIC DEVICE THERAPY: CPT

## 2023-12-01 PROCEDURE — 97530 THERAPEUTIC ACTIVITIES: CPT

## 2023-12-01 PROCEDURE — 97110 THERAPEUTIC EXERCISES: CPT

## 2023-12-01 NOTE — PROGRESS NOTES
PHYSICAL / OCCUPATIONAL THERAPY - DAILY TREATMENT NOTE (updated )    Patient Name: Cezar Sero    Date: 2023    : 1950  Insurance: Payor: Trever Pressley / Plan: Altaf Breaux PLUS / Product Type: *No Product type* /      Patient  verified Yes     Visit #   Current / Total 11 12   Time   In / Out 11:30 12:30   Pain   In / Out 5/10 2/10   Subjective Functional Status/Changes: \"I have some pain in my knee today. \"   Changes to: Allergies, Med Hx, Sx Hx?   no       TREATMENT AREA =  Pain in left knee [M25.562]  Presence of left artificial knee joint [Z96.652]    OBJECTIVE    Modalities Rationale:     decrease edema, decrease inflammation, and decrease pain to improve patient's ability to progress to PLOF and address remaining functional goals. min [] Estim Unattended, type/location:                                      []  w/ice    []  w/heat    min [] Estim Attended, type/location:                                     []  w/US     []  w/ice    []  w/heat    []  TENS insruct      min []  Mechanical Traction: type/lbs                   []  pro   []  sup   []  int   []  cont    []  before manual    []  after manual    min []  Ultrasound, settings/location:      min []  Iontophoresis w/ dexamethasone, location:                                               []  take home patch       []  in clinic        min  unbilled []  Ice     []  Heat    location/position:     min []  Paraffin,  details:    10 min [x]  Vasopneumatic Device, press/temp:     min []  Kenyon Pouch / Maria G Gobble: If using vaso (only need to measure limb vaso being performed on)      pre-treatment girth : 41 cm      post-treatment girth : 40.5 cm      measured at (landmark location) :  Mid-patella    min []  Other:    Skin assessment post-treatment (if applicable):    [x]  intact    []  redness- no adverse reaction                 []redness - adverse reaction:         Therapeutic Procedures:   Tx Min Billable or 1:1 Min

## 2023-12-04 NOTE — PATIENT INSTRUCTIONS
Patient Education        Total Knee Replacement: What to Expect at [facility] [place] had a total knee replacement. The doctor replaced the worn ends of the bones that connect to your knee (thighbone and lower leg bone) with plastic and metal parts. When you leave the hospital, you should be able to move around with a walker or crutches. But you will need someone to help you at home until you have more energy and can move around better. You will go home with a bandage and stitches, staples, skin glue, or tape strips. Change the bandage as your doctor tells you to. If you have stitches or staples, your doctor will remove them about 2 weeks after your surgery. Glue or tape strips will fall off on their own over time. You may still have some mild pain, and the area may be swollen for a few months after surgery. Your knee will continue to improve for up to a year. You will probably use a walker for some time after surgery. When you are ready, you can use a cane. You may be able to walk without support after a couple weeks, or when you are comfortable. You will need to do months of physical rehabilitation (rehab) after a knee replacement. Rehab will help you strengthen the muscles of the knee and help you regain movement. After you recover, your artificial knee will allow you to do normal daily activities with less pain or no pain at all. You may be able to hike, dance, or ride a bike. Talk to your doctor about whether you can do more strenuous activities. Always tell your caregivers that you have an artificial knee. How long it will take to walk on your own, return to normal activities, and go back to work depends on your health and how well your rehabilitation (rehab) program goes. The better you do with your rehab exercises, the quicker you will get your strength and movement back. This care sheet gives you a general idea about how long it will take for you to recover.  But each person recovers at a

## 2023-12-04 NOTE — PROGRESS NOTES
Nayana Montalvo (:  1950) is a Established patient, evaluated via telephone on 2023    [address], [address] [place] [address] [id number]  Dept: [phone]  Dept Fax: [phone]   Chief Complaint   Patient presents with    Post-Op Check    Knee Pain     Patient-Reported Vitals  No data recorded   Allergies   Allergen Reactions    Promethazine Other (See Comments)     Lethargy, slurred speech, hallucinations. Current Outpatient Medications   Medication Sig Dispense Refill    triamcinolone (KENALOG) 0.1 % cream APPLY A THIN LAYER TO THE AFFECTED AREA(S) BY TOPICAL ROUTE 2 TIMES PER DAY FOR 7-10 DAYS 15 g 0    methylPREDNISolone (MEDROL DOSEPACK) 4 MG tablet Take by mouth Per Dose pack instructions 1 kit 0    amLODIPine (NORVASC) 5 MG tablet Take 1 tablet by mouth daily      atorvastatin (LIPITOR) 10 MG tablet Take 1 tablet by mouth daily      carvedilol (COREG) 12.5 MG tablet TAKE 1/2 (ONE-HALF) TABLET BY MOUTH IN THE MORNING AND 1 IN THE EVENING      vitamin D3 (CHOLECALCIFEROL) 125 MCG (5000 UT) TABS tablet Take 1 tablet by mouth daily      Cyanocobalamin 1000 MCG SUBL Take 1,000 mcg by mouth daily      estradiol (ESTRACE) 0.1 MG/GM vaginal cream Apply to vaginal vault with fingers daily      ferrous sulfate (IRON 325) 325 (65 Fe) MG tablet Take by mouth every morning (before breakfast)      levothyroxine (SYNTHROID) 50 MCG tablet TAKE 1 TABLET BY MOUTH ONCE DAILY IN THE MORNING AT 6AM ON EMPTY STOMACH      nitroGLYCERIN (NITROSTAT) 0.4 MG SL tablet Place 1 tablet under the tongue      pantoprazole (PROTONIX) 40 MG tablet Take 1 tablet by mouth daily      valsartan (DIOVAN) 160 MG tablet TAKE 1 TABLET BY MOUTH TWICE DAILY FOR HIGH BLOOD PRESSURE       No current facility-administered medications for this visit.       Patient Active Problem List   Diagnosis    Pure hypercholesterolemia    Atrophic vaginitis

## 2023-12-05 ENCOUNTER — TELEMEDICINE (OUTPATIENT)
Age: 73
End: 2023-12-05

## 2023-12-05 ENCOUNTER — HOSPITAL ENCOUNTER (OUTPATIENT)
Facility: HOSPITAL | Age: 73
Setting detail: RECURRING SERIES
Discharge: HOME | End: 2023-12-08
Payer: MEDICARE

## 2023-12-05 DIAGNOSIS — M25.562 LEFT KNEE PAIN, UNSPECIFIED CHRONICITY: ICD-10-CM

## 2023-12-05 DIAGNOSIS — Z96.652 STATUS POST TOTAL KNEE REPLACEMENT, LEFT: Primary | ICD-10-CM

## 2023-12-05 PROCEDURE — 97016 VASOPNEUMATIC DEVICE THERAPY: CPT

## 2023-12-05 PROCEDURE — 97110 THERAPEUTIC EXERCISES: CPT

## 2023-12-05 PROCEDURE — 97530 THERAPEUTIC ACTIVITIES: CPT

## 2023-12-05 PROCEDURE — 97112 NEUROMUSCULAR REEDUCATION: CPT

## 2023-12-05 NOTE — PROGRESS NOTES
PHYSICAL / OCCUPATIONAL THERAPY - DAILY TREATMENT NOTE (updated )    Patient Name: Nael Mcduffie    Date: 2023    : 1950  Insurance: Payor: Rosalio Ballard / Plan: Oneil Basil PLUS / Product Type: *No Product type* /      Patient  verified Yes     Visit #   Current / Total 12 12   Time   In / Out 1650 1753   Pain   In / Out 4 3   Subjective Functional Status/Changes: Patient reports that she still has difficulty negotiating stairs. Changes to:  Meds, Allergies, Med Hx, Sx Hx? If yes, update Summary List no      TREATMENT AREA =  Pain in left knee [M25.562]  Presence of left artificial knee joint [Z96.652]    OBJECTIVE  Modalities Rationale:     decrease edema and decrease pain to improve patient's ability to Complete ADLS  10 min [x]  Vasopneumatic Device, press/temp: none, low to left knee in supine post treatment with LLE elevated on wedge      If using vaso (need to measure limb vaso being performed on)      pre-treatment girth : 42.0 cm      post-treatment girth 41.5 cm      measured at (landmark location): mid-patella      [x] Skin assessment post-treatment (if applicable):    [x]  intact    []  redness- no adverse reaction     []redness - adverse reaction:      *performed with no pressure to limit stress on incision given blistering and erythema. Therapeutic Procedures: Tx Min Billable or 1:1 Min (if diff from Tx Min) Procedure, Rationale, Specifics   20  61407 Therapeutic Exercise (timed):  increase ROM, strength, coordination, balance, and proprioception to improve patient's ability to progress to PLOF and address remaining functional goals.  (see flow sheet as applicable)     Details if applicable:       13  30429 Therapeutic Activity (timed):  use of dynamic activities replicating functional movements to increase ROM, strength, coordination, balance, and proprioception in order to improve patient's ability to progress to PLOF and address remaining High Dose Vitamin A Counseling: Side effects reviewed, pt to contact office should one occur.

## 2023-12-07 ENCOUNTER — HOSPITAL ENCOUNTER (OUTPATIENT)
Facility: HOSPITAL | Age: 73
Setting detail: RECURRING SERIES
Discharge: HOME | End: 2023-12-10
Payer: MEDICARE

## 2023-12-07 PROCEDURE — 97016 VASOPNEUMATIC DEVICE THERAPY: CPT

## 2023-12-07 PROCEDURE — 97110 THERAPEUTIC EXERCISES: CPT

## 2023-12-07 PROCEDURE — 97112 NEUROMUSCULAR REEDUCATION: CPT

## 2023-12-07 PROCEDURE — 97530 THERAPEUTIC ACTIVITIES: CPT

## 2023-12-07 NOTE — PROGRESS NOTES
PHYSICAL / OCCUPATIONAL THERAPY - DAILY TREATMENT NOTE (updated )    Patient Name: Kiesha Bishop    Date: 2023    : 1950  Insurance: Payor: Kellie Arias / Plan: Samuel Oakley PLUS / Product Type: *No Product type* /      Patient  verified Yes     Visit #   Current / Total 13 17   Time   In / Out 1730 1838   Pain   In / Out 5 3   Subjective Functional Status/Changes: \"I am still having a lot of pain on the outside of the thigh that makes it hard to walk. \"   Changes to:  Meds, Allergies, Med Hx, Sx Hx? If yes, update Summary List no       TREATMENT AREA =  Pain in left knee [M25.562]  Presence of left artificial knee joint [Z96.652]    OBJECTIVE  Modalities Rationale:     decrease edema and decrease pain to improve patient's ability to Complete ADLS         10 min [x]  Vasopneumatic Device, press/temp: none, low to left knee in supine post treatment with LLE elevated on wedge         If using vaso (need to measure limb vaso being performed on)2. pre-treatment girth : 41.8 cm      post-treatment girth 41.3 cm      measured at (landmark location): mid-patella      [x] Skin assessment post-treatment (if applicable):    [x]  intact    []  redness- no adverse reaction     []redness - adverse reaction:      *performed with no pressure to limit stress on incision given blistering and erythema. Therapeutic Procedures: Tx Min Billable or 1:1 Min (if diff from Tx Min) Procedure, Rationale, Specifics   28 15 32649 Therapeutic Exercise (timed):  increase ROM, strength, coordination, balance, and proprioception to improve patient's ability to progress to PLOF and address remaining functional goals.  (see flow sheet as applicable)     Details if applicable:       15  73408 Therapeutic Activity (timed):  use of dynamic activities replicating functional movements to increase ROM, strength, coordination, balance, and proprioception in order to improve patient's ability to progress to

## 2023-12-11 ENCOUNTER — HOSPITAL ENCOUNTER (OUTPATIENT)
Facility: HOSPITAL | Age: 73
Setting detail: RECURRING SERIES
Discharge: HOME | End: 2023-12-14
Payer: MEDICARE

## 2023-12-11 PROCEDURE — 97016 VASOPNEUMATIC DEVICE THERAPY: CPT

## 2023-12-11 PROCEDURE — 97112 NEUROMUSCULAR REEDUCATION: CPT

## 2023-12-11 PROCEDURE — 97530 THERAPEUTIC ACTIVITIES: CPT

## 2023-12-11 PROCEDURE — 97110 THERAPEUTIC EXERCISES: CPT

## 2023-12-11 NOTE — PROGRESS NOTES
PLOF and address remaining functional goals. (see flow sheet as applicable)     Details if applicable:     12  67511 Neuromuscular Re-Education (timed):  improve balance, coordination, kinesthetic sense, posture, core stability and proprioception to improve patient's ability to develop conscious control of individual muscles and awareness of position of extremities in order to progress to PLOF and address remaining functional goals. (see flow sheet as applicable)     Details if applicable:     62  [address] Totals Reminder: bill using total billable min of TIMED therapeutic procedures (example: do not include dry needle or estim unattended, both untimed codes, in totals to left)  8-22 min = 1 unit; 23-37 min = 2 units; 38-52 min = 3 units; 53-67 min = 4 units; 68-82 min = 5 units   Total Total       TOTAL TREATMENT TIME:        67       [x]  Patient Education billed concurrently with other procedures   [x] Review HEP    [] Progressed/Changed HEP, detail:    [] Other detail:       Objective Information/Functional Measures/Assessment    Focused on eccentric control by introducing reverse step up exercise. Patient required several verbal and tactile cues to perform the exercise with proper mechanics promoting target muscle recruitment. Will advance exercise by introducing leg press next visit. Patient will continue to benefit from skilled PT services to modify and progress therapeutic interventions, analyze and address functional mobility deficits, analyze and address ROM deficits, analyze and address strength deficits, analyze and address soft tissue restrictions, analyze and cue for proper movement patterns, analyze and modify for postural abnormalities, analyze and address imbalance/dizziness, and instruct in home and community integration to address functional deficits and attain remaining goals. Progress toward goals / Updated goals:  []  See Progress Note/Recertification    Short Term Goals:  To be

## 2023-12-22 ENCOUNTER — HOSPITAL ENCOUNTER (OUTPATIENT)
Facility: HOSPITAL | Age: 73
Setting detail: RECURRING SERIES
Discharge: HOME | End: 2023-12-25
Payer: MEDICARE

## 2023-12-22 PROCEDURE — 97110 THERAPEUTIC EXERCISES: CPT

## 2023-12-22 PROCEDURE — 97112 NEUROMUSCULAR REEDUCATION: CPT

## 2023-12-22 PROCEDURE — 97530 THERAPEUTIC ACTIVITIES: CPT

## 2023-12-22 PROCEDURE — 97016 VASOPNEUMATIC DEVICE THERAPY: CPT

## 2023-12-22 NOTE — PROGRESS NOTES
[facility], [address]  Phone: [phone]   Fax: [phone]      Continued Plan of Care/ Re-certification for Physical Therapy Services    Patient name: Tiara Ellison Start of Care: 2023   Referral source: Eagle Valladares MD : 1950               Medical Diagnosis: Pain in left knee [M25.562]  Presence of left artificial knee joint [Z96.652]      Onset Date:10/31/23               Treatment Diagnosis:  M25.562  LEFT KNEE PAIN                                           Prior Hospitalization: see medical history Provider#: [id number]   Medications: Verified on Patient Summary List      Comorbidities:  bladder surgery- March; right TKR in ; hypertension; thyroid  Prior Level of Function: functionally independent, no AD, enjoys cooking- standing all day    Visits from Start of Care: 17    Missed Visits: 0    Reporting Period: 2023 to 2023    The Plan of Care and following information is based on the patient's current status:    Key functional changes:   Short Term Goals: To be accomplished in 6 weeks}:  Patient will be independent and compliant with HEP to progress toward goals and restore functional mobility. Eval Status: issued at eval  23 PN: pt reports daily compliance with HEP up to 2x a day 23 Progressing   Current: Steadily progressing exercise intensity and walking distance in home setting. 23 Progressing     Patient will demonstrate ability to ambulate 1000 feet with LRAD and normalized gait pattern for community ambulation including doctor appointments and grocery shopping.    Eval Status: Gait: utilizing RW, stiff knee with reduced knee flexion, minimal heel to toe, hip in ER, step through pattern with reduced step lengths   23 PN: pt able to amb without AD with step through gait pattern but, demonstrates decreased heel toe mechanics with general stiff knee gait mechanics and decreased knee

## 2023-12-22 NOTE — PROGRESS NOTES
PHYSICAL / OCCUPATIONAL THERAPY - DAILY TREATMENT NOTE (updated )    Patient Name: Lashanda Oneill    Date: 2023    : 1950  Insurance: Payor: Miles Dalal / Plan: Tony Loosen PLUS / Product Type: *No Product type* /      Patient  verified Yes     Visit #   Current / Total 17 17   Time   In / Out 1327 1435   Pain   In / Out 4 2   Subjective Functional Status/Changes: \"I am walking more at home, but my pace is slow. I am hoping I can have more therapy to improve my walking if the insurance will allow it. \"   Changes to:  Meds, Allergies, Med Hx, Sx Hx? If yes, update Summary List no       TREATMENT AREA =  Pain in left knee [M25.562]  Presence of left artificial knee joint [Z96.652]    OBJECTIVE  Modalities Rationale:     decrease edema and decrease pain to improve patient's ability to Complete ADLS              10 min [x]  Vasopneumatic Device, press/temp: none, low to left knee in supine post treatment with LLE elevated on wedge         If using vaso (need to measure limb vaso being performed on)2. pre-treatment girth : 42.8 cm      post-treatment girth 41.5 cm      measured at (landmark location): mid-patella          Therapeutic Procedures: Tx Min Billable or 1:1 Min (if diff from Tx Min) Procedure, Rationale, Specifics   28  61803 Therapeutic Exercise (timed):  increase ROM, strength, coordination, balance, and proprioception to improve patient's ability to progress to PLOF and address remaining functional goals. (see flow sheet as applicable)     Details if applicable:       15  21037 Therapeutic Activity (timed):  use of dynamic activities replicating functional movements to increase ROM, strength, coordination, balance, and proprioception in order to improve patient's ability to progress to PLOF and address remaining functional goals.   (see flow sheet as applicable)     Details if applicable:     15  79330 Neuromuscular Re-Education (timed):  improve balance,

## 2024-01-11 ENCOUNTER — HOSPITAL ENCOUNTER (OUTPATIENT)
Facility: HOSPITAL | Age: 74
Setting detail: RECURRING SERIES
Discharge: HOME | End: 2024-01-14
Payer: MEDICARE

## 2024-01-11 PROCEDURE — 97110 THERAPEUTIC EXERCISES: CPT

## 2024-01-11 PROCEDURE — 97112 NEUROMUSCULAR REEDUCATION: CPT

## 2024-01-11 PROCEDURE — 97016 VASOPNEUMATIC DEVICE THERAPY: CPT

## 2024-01-11 PROCEDURE — 97530 THERAPEUTIC ACTIVITIES: CPT

## 2024-01-11 NOTE — PROGRESS NOTES
PHYSICAL / OCCUPATIONAL THERAPY - DAILY TREATMENT NOTE (updated )    Patient Name: Monika Trinh    Date: 2024    : 1950  Insurance: Payor: [employer]CTOR [employer] / Plan: BENJIE [facility] [employer] MED[employer] PLUS / Product Type: *No Product type* /      Patient  verified Yes     Visit #   Current / Total 18 17   Time   In / Out 1450 1550   Pain   In / Out 3 1-2   Subjective Functional Status/Changes: Patient reports that she has been doing her exercises while waiting for insurance authorization. She stated that her knee gosia when she walks.    Changes to:  Meds, Allergies, Med Hx, Sx Hx?  If yes, update Summary List no       TREATMENT AREA =  Pain in left knee [M25.562]  Presence of left artificial knee joint [Z96.652]    OBJECTIVE  Modalities Rationale:     decrease edema and decrease pain to improve patient's ability to Complete ADLS              10 min [x]  Vasopneumatic Device, press/temp: none, low to left knee in supine post treatment with LLE elevated on wedge         If using vaso (need to measure limb vaso being performed on)2.      pre-treatment girth : 42.6 cm      post-treatment girth 41.7 cm      measured at (landmark location): mid-patella          Therapeutic Procedures:  Tx Min Billable or 1:1 Min (if diff from Tx Min) Procedure, Rationale, Specifics   20  97242 Therapeutic Exercise (timed):  increase ROM, strength, coordination, balance, and proprioception to improve patient's ability to progress to PLOF and address remaining functional goals. (see flow sheet as applicable)     Details if applicable:       15  29945 Therapeutic Activity (timed):  use of dynamic activities replicating functional movements to increase ROM, strength, coordination, balance, and proprioception in order to improve patient's ability to progress to PLOF and address remaining functional goals.  (see flow sheet as applicable)     Details if applicable:     15  36774 Neuromuscular Re-Education (timed):  improve

## 2024-01-12 ENCOUNTER — HOSPITAL ENCOUNTER (OUTPATIENT)
Facility: HOSPITAL | Age: 74
Setting detail: RECURRING SERIES
Discharge: HOME | End: 2024-01-15
Payer: MEDICARE

## 2024-01-12 PROCEDURE — 97110 THERAPEUTIC EXERCISES: CPT

## 2024-01-12 PROCEDURE — 97112 NEUROMUSCULAR REEDUCATION: CPT

## 2024-01-12 PROCEDURE — 97530 THERAPEUTIC ACTIVITIES: CPT

## 2024-01-12 PROCEDURE — 97016 VASOPNEUMATIC DEVICE THERAPY: CPT

## 2024-01-12 NOTE — PROGRESS NOTES
PHYSICAL / OCCUPATIONAL THERAPY - DAILY TREATMENT NOTE (updated )    Patient Name: Monika Trinh    Date: 2024    : 1950  Insurance: Payor: V[employer]CTOR MED[employer] / Plan: BENJIE [facility] [employer] MEDIBLUE PLUS / Product Type: *No Product type* /      Patient  verified Yes     Visit #   Current / Total  23   Time   In / Out 3:28 pm 4:28 pm   Pain   In / Out 2 0   Subjective Functional Status/Changes: Pt reports her knee is doing better though still has some pain.    Changes to:  Meds, Allergies, Med Hx, Sx Hx?  If yes, update Summary List no       TREATMENT AREA =  Pain in left knee [M25.562]  Presence of left artificial knee joint [Z96.652]    OBJECTIVE  Modalities Rationale:     decrease edema and decrease pain to improve patient's ability to Complete ADLS              10 min [x]  Vasopneumatic Device, press/temp: none, low to left knee in supine post treatment with LLE elevated on wedge         If using vaso (need to measure limb vaso being performed on)2.      pre-treatment girth : 40.5 cm      post-treatment girth 40.5 cm      measured at (landmark location): mid-patella          Therapeutic Procedures:  Tx Min Billable or 1:1 Min (if diff from Tx Min) Procedure, Rationale, Specifics   20 10 01366 Therapeutic Exercise (timed):  increase ROM, strength, coordination, balance, and proprioception to improve patient's ability to progress to PLOF and address remaining functional goals. (see flow sheet as applicable)     Details if applicable:       15 15 84587 Therapeutic Activity (timed):  use of dynamic activities replicating functional movements to increase ROM, strength, coordination, balance, and proprioception in order to improve patient's ability to progress to PLOF and address remaining functional goals.  (see flow sheet as applicable)     Details if applicable:     15 15 00536 Neuromuscular Re-Education (timed):  improve balance, coordination, kinesthetic sense, posture, core stability and

## 2024-01-16 ENCOUNTER — HOSPITAL ENCOUNTER (OUTPATIENT)
Facility: HOSPITAL | Age: 74
Setting detail: RECURRING SERIES
Discharge: HOME | End: 2024-01-19
Payer: MEDICARE

## 2024-01-16 PROCEDURE — 97016 VASOPNEUMATIC DEVICE THERAPY: CPT

## 2024-01-16 PROCEDURE — 97112 NEUROMUSCULAR REEDUCATION: CPT

## 2024-01-16 PROCEDURE — 97530 THERAPEUTIC ACTIVITIES: CPT

## 2024-01-16 PROCEDURE — 97110 THERAPEUTIC EXERCISES: CPT

## 2024-01-16 NOTE — PROGRESS NOTES
PHYSICAL / OCCUPATIONAL THERAPY - DAILY TREATMENT NOTE (updated )    Patient Name: Monika Trinh    Date: 2024    : 1950  Insurance: Payor: V[employer]CTOR MEDICARE / Plan: BENJIE [facility] [employer] MEDIBLUE PLUS / Product Type: *No Product type* /      Patient  verified Yes     Visit #   Current / Total 20 23   Time   In / Out 11:30 am 12:30 pm   Pain   In / Out 2 0   Subjective Functional Status/Changes:  Pt reports she is \"doing well\". Notes minimal pain in the knee upon arrival today.    Changes to:  Meds, Allergies, Med Hx, Sx Hx?  If yes, update Summary List no       TREATMENT AREA =  Pain in left knee [M25.562]  Presence of left artificial knee joint [Z96.652]    OBJECTIVE  Modalities Rationale:     decrease edema and decrease pain to improve patient's ability to Complete ADLS              10 min [x]  Vasopneumatic Device, press/temp: none, low to left knee in supine post treatment with LLE elevated on wedge         If using vaso (need to measure limb vaso being performed on)2.      pre-treatment girth : 42.4 cm      post-treatment girth 42.2 cm      measured at (landmark location): mid-patella          Therapeutic Procedures:  Tx Min Billable or 1:1 Min (if diff from Tx Min) Procedure, Rationale, Specifics   20 10 44452 Therapeutic Exercise (timed):  increase ROM, strength, coordination, balance, and proprioception to improve patient's ability to progress to PLOF and address remaining functional goals. (see flow sheet as applicable)     Details if applicable:       15 15 76988 Therapeutic Activity (timed):  use of dynamic activities replicating functional movements to increase ROM, strength, coordination, balance, and proprioception in order to improve patient's ability to progress to PLOF and address remaining functional goals.  (see flow sheet as applicable)     Details if applicable:     15 15 10143 Neuromuscular Re-Education (timed):  improve balance, coordination, kinesthetic sense, posture,

## 2024-01-19 ENCOUNTER — HOSPITAL ENCOUNTER (OUTPATIENT)
Facility: HOSPITAL | Age: 74
Setting detail: RECURRING SERIES
Discharge: HOME | End: 2024-01-22
Payer: MEDICARE

## 2024-01-19 PROCEDURE — 97016 VASOPNEUMATIC DEVICE THERAPY: CPT

## 2024-01-19 PROCEDURE — 97530 THERAPEUTIC ACTIVITIES: CPT

## 2024-01-19 PROCEDURE — 97110 THERAPEUTIC EXERCISES: CPT

## 2024-01-19 NOTE — PROGRESS NOTES
Physical Therapy Discharge Instructions    [facility]  [address] [place] [address]  Ph [phone]  Fx [phone]      Patient: Monika Trinh  : 1950      Continue Home Exercise Program 1-3 times per day. Progress strengthening exercises with green band and continue with body weight resisted exercises on stairs and sit to stands/squats. Make sure you continue to stretch your knee all the way straight and all the way bent every day to prevent losing the range you have gained.    Swelling- continue elevating 15-20 minutes above the level of your heart daily; add ankle pumps; exercising will also help decrease swelling.      Continue with    [x] Ice  as needed for pain             []   Return to activity with home program- shoot for at least 30 minutes of cardio activity daily          Jared Wild, PT 2024 10:44 AM   
good progress towards goals, meeting most, including FOTO goal indicating true functional changes. Patient is in agreement to D/C this session to independent HEP.         Progress toward goals / Updated goals:  []  See Progress Note/Recertification     Short Term Goals: To be accomplished in 6 weeks}:  Patient will be independent and compliant with HEP to progress toward goals and restore functional mobility.   Eval Status: issued at eval  11/29/23 PN: pt reports daily compliance with HEP up to 2x a day 11/29/23 Progressing   D/C 1/19/24: Patient reports it is too cold to walk, but utilizing bike at home; focused on strengthening MET     Patient will demonstrate ability to ambulate 1000 feet with LRAD and normalized gait pattern for community ambulation including doctor appointments and grocery shopping.   Eval Status: Gait: utilizing RW, stiff knee with reduced knee flexion, minimal heel to toe, hip in ER, step through pattern with reduced step lengths   11/29/23 PN: pt able to amb without AD with step through gait pattern but, demonstrates decreased heel toe mechanics with general stiff knee gait mechanics and decreased knee flexion during swing phase 11/29/23 Progressing   D/C 1/19/24: stiff knee gait pattern in swing phase improving but still present.  Progressing     Pt will demonstrate left knee extension AROM and PROM to 0 degrees extension to improve gait mechanics and standing tolerance for cooking.   Eval Status:                                          AROM                                  PROM              Knee Left Right Left Right   Extension Lacks 4 degrees 0 Lacks 6 degrees 0             11/29/23 PN:  PROM: Extension: Left: 0 deg, AROM: Extension: 0 deg 11/29/23 MET     Long Term Goals: To be accomplished in 12 weeks:  Patient will improve FOTO score by 40 points to improve functional tolerance for performing stairs.  Eval Status: FOTO 13  11/29/23 PN:FOTO 62 11/29/23 MET  FOTO score = an established 
remains 5/10 at worst, during ambulation 12/7/23 Progressing  D/C 1/19/24:  progressing, pain consistently 2-3/10 1/16/2024 Progressing        5.   Pt will demonstrate 12x STS in 30 second STS to match age- norm group for transfer ability and LE functional strength.   Eval Status: inability to complete due to post-op status, difficulty with all transfers  11/29/23 PN: STS Test: 10x without UE support 11/29/23 Progressing   Current: Completed 12.5 STS without UE support, MET        6.   Pt will demonstrate ability to complete 12 steps with normal step over step pattern safely to acess top floor of home.        Eval status: unable due to post-op status              11/29/23 PN: able to navigate ~12 steps with bilateral UE support and step through gait pattern but, demonstrates moderate antalgia and occasional circumduction compensation 11/29/23 Progressing           D/C 1/19/24:  reciprocal ascending and descending-some difficulty with step downs requiring UE assist due to left knee pain, good performance with concentric quad action with step ups nearly met         Assessment/ Summary of Care: Patient has completed 21 Physical Therapy visits for  left knee pain. Patient is s/p left TKR on 10/31/23.. Patient reports her biggest deficit is walking slowly. She reports compliance with HEP and confidence in transition to independent HEP. Patient demonstrates improvement in Knee ROM, knee strength, and functional activities such as ambulation, transfers, and stair performance. Patient continues to demonstrate deficits in edema, pain, and LE weakness, which were addressed with education and updated HEP for continued progress. Patient has made good progress towards goals, meeting most, including FOTO goal indicating true functional changes. Patient is in agreement to D/C this session to independent HEP.       RECOMMENDATIONS:  [x]Discontinue therapy: [x]Patient has reached or is progressing toward set goals      []Patient is

## 2024-01-22 ENCOUNTER — TELEPHONE (OUTPATIENT)
Age: 74
End: 2024-01-22

## 2024-01-22 RX ORDER — CLINDAMYCIN HYDROCHLORIDE 300 MG/1
600 CAPSULE ORAL ONCE AS NEEDED
Qty: 2 CAPSULE | Refills: 3 | Status: SHIPPED | OUTPATIENT
Start: 2024-01-22

## 2024-01-22 NOTE — TELEPHONE ENCOUNTER
Pt had  a tkr 10/31/2023    PT has an a dental appt in Feb pt is requesting an abx for the appt         Pt is also stating she is in a lot of pain and is requesting to speak with you.     [facility] [address] [place] [address] - [address] - P [phone] - F [phone]  [address] [facility] [address]  Phone: [phone]  Fax: [phone]

## 2024-03-19 DIAGNOSIS — Z96.652 STATUS POST TOTAL KNEE REPLACEMENT, LEFT: Primary | ICD-10-CM

## 2024-03-22 ENCOUNTER — HOSPITAL ENCOUNTER (OUTPATIENT)
Facility: HOSPITAL | Age: 74
End: 2024-03-22
Payer: MEDICARE

## 2024-03-22 ENCOUNTER — OFFICE VISIT (OUTPATIENT)
Age: 74
End: 2024-03-22
Payer: MEDICARE

## 2024-03-22 VITALS — BODY MASS INDEX: 25.16 KG/M2 | WEIGHT: 151 LBS | HEIGHT: 65 IN

## 2024-03-22 DIAGNOSIS — M25.562 LEFT KNEE PAIN, UNSPECIFIED CHRONICITY: ICD-10-CM

## 2024-03-22 DIAGNOSIS — M25.562 LEFT KNEE PAIN, UNSPECIFIED CHRONICITY: Primary | ICD-10-CM

## 2024-03-22 PROCEDURE — 1123F ACP DISCUSS/DSCN MKR DOCD: CPT | Performed by: ORTHOPAEDIC SURGERY

## 2024-03-22 PROCEDURE — 99213 OFFICE O/P EST LOW 20 MIN: CPT | Performed by: ORTHOPAEDIC SURGERY

## 2024-03-22 PROCEDURE — 93971 EXTREMITY STUDY: CPT

## 2024-03-22 NOTE — PROGRESS NOTES
Name: Monika Trinh    : 1950     [facility]  [address], [address]  [place] [address]  Dept: [phone]  Dept Fax: [phone]     Chief Complaint   Patient presents with    Knee Pain        Ht 1.651 m (5' 5\")   Wt 68.5 kg (151 lb)   BMI 25.13 kg/m²      Allergies   Allergen Reactions    Promethazine Other (See Comments)     Lethargy, slurred speech, hallucinations.        Current Outpatient Medications   Medication Sig Dispense Refill    clindamycin (CLEOCIN) 300 MG capsule Take 2 capsules by mouth 1 (one) time if needed (take both tablets one hour prior to dental appointment) Take both tablets one hour before dental procedure 2 capsule 3    triamcinolone (KENALOG) 0.1 % cream APPLY A THIN LAYER TO THE AFFECTED AREA(S) BY TOPICAL ROUTE 2 TIMES PER DAY FOR 7-10 DAYS 15 g 0    methylPREDNISolone (MEDROL DOSEPACK) 4 MG tablet Take by mouth Per Dose pack instructions 1 kit 0    amLODIPine (NORVASC) 5 MG tablet Take 1 tablet by mouth daily      atorvastatin (LIPITOR) 10 MG tablet Take 1 tablet by mouth daily      carvedilol (COREG) 12.5 MG tablet TAKE 1/2 (ONE-HALF) TABLET BY MOUTH IN THE MORNING AND 1 IN THE EVENING      vitamin D3 (CHOLECALCIFEROL) 125 MCG (5000 UT) TABS tablet Take 1 tablet by mouth daily      estradiol (ESTRACE) 0.1 MG/GM vaginal cream Apply to vaginal vault with fingers daily      ferrous sulfate (IRON 325) 325 (65 Fe) MG tablet Take by mouth every morning (before breakfast)      levothyroxine (SYNTHROID) 50 MCG tablet TAKE 1 TABLET BY MOUTH ONCE DAILY IN THE MORNING AT 6AM ON EMPTY STOMACH      nitroGLYCERIN (NITROSTAT) 0.4 MG SL tablet Place 1 tablet under the tongue      pantoprazole (PROTONIX) 40 MG tablet Take 1 tablet by mouth daily      valsartan (DIOVAN) 160 MG tablet TAKE 1 TABLET BY MOUTH TWICE DAILY FOR HIGH BLOOD PRESSURE       No current facility-administered medications for this visit.

## 2024-03-24 LAB — ECHO BSA: 1.77 M2

## 2024-03-27 DIAGNOSIS — Z96.652 STATUS POST TOTAL KNEE REPLACEMENT, LEFT: ICD-10-CM

## 2024-03-27 PROCEDURE — 73564 X-RAY EXAM KNEE 4 OR MORE: CPT | Performed by: ORTHOPAEDIC SURGERY

## 2024-05-24 ENCOUNTER — OFFICE VISIT (OUTPATIENT)
Age: 74
End: 2024-05-24
Payer: MEDICARE

## 2024-05-24 VITALS
BODY MASS INDEX: 28.7 KG/M2 | HEART RATE: 71 BPM | SYSTOLIC BLOOD PRESSURE: 131 MMHG | DIASTOLIC BLOOD PRESSURE: 70 MMHG | WEIGHT: 152 LBS | HEIGHT: 61 IN | OXYGEN SATURATION: 96 %

## 2024-05-24 DIAGNOSIS — R94.31 ABNORMAL EKG: ICD-10-CM

## 2024-05-24 DIAGNOSIS — I10 ESSENTIAL (PRIMARY) HYPERTENSION: Primary | ICD-10-CM

## 2024-05-24 DIAGNOSIS — E78.00 PURE HYPERCHOLESTEROLEMIA: ICD-10-CM

## 2024-05-24 PROCEDURE — 93000 ELECTROCARDIOGRAM COMPLETE: CPT | Performed by: INTERNAL MEDICINE

## 2024-05-24 PROCEDURE — 3078F DIAST BP <80 MM HG: CPT | Performed by: INTERNAL MEDICINE

## 2024-05-24 PROCEDURE — 99214 OFFICE O/P EST MOD 30 MIN: CPT | Performed by: INTERNAL MEDICINE

## 2024-05-24 PROCEDURE — 3074F SYST BP LT 130 MM HG: CPT | Performed by: INTERNAL MEDICINE

## 2024-05-24 PROCEDURE — 1123F ACP DISCUSS/DSCN MKR DOCD: CPT | Performed by: INTERNAL MEDICINE

## 2024-05-24 ASSESSMENT — PATIENT HEALTH QUESTIONNAIRE - PHQ9
SUM OF ALL RESPONSES TO PHQ QUESTIONS 1-9: 0
SUM OF ALL RESPONSES TO PHQ9 QUESTIONS 1 & 2: 0
SUM OF ALL RESPONSES TO PHQ QUESTIONS 1-9: 0
DEPRESSION UNABLE TO ASSESS: FUNCTIONAL CAPACITY MOTIVATION LIMITS ACCURACY
SUM OF ALL RESPONSES TO PHQ QUESTIONS 1-9: 0
SUM OF ALL RESPONSES TO PHQ QUESTIONS 1-9: 0
2. FEELING DOWN, DEPRESSED OR HOPELESS: NOT AT ALL
1. LITTLE INTEREST OR PLEASURE IN DOING THINGS: NOT AT ALL

## 2024-05-24 ASSESSMENT — ENCOUNTER SYMPTOMS
EYES NEGATIVE: 1
GASTROINTESTINAL NEGATIVE: 1
SHORTNESS OF BREATH: 1

## 2024-05-24 NOTE — PROGRESS NOTES
Monika Trinh is a 73 y.o. year old female.    Follow-up of hypertension, hyperlipidemia    6/20 complains of high heart rate in 80s and 90s at times when she feels bad in the chest.  6/18 edema resolved on its own.  Mild dyspnea on exertion persists.  No chest pain  6/17 edema left leg for 2 months after [place] trip  6/19/2023 dyspnea on exertion going up 1 flight of steps.  No chest pain, dizziness, edema, palpitations.  5/24/2024 still with dyspnea on exertion at 1 full flight of steps.  Status post knee replacement but does not exercise.  Had an episode of chest tightness couple weeks ago while working in the kitchen lasting 10 minutes without radiation or associated symptoms of dyspnea or diaphoresis.          Review of Systems   Constitutional: Negative.    HENT: Negative.     Eyes: Negative.    Respiratory:  Positive for shortness of breath.    Cardiovascular: Negative.    Gastrointestinal: Negative.    Endocrine: Negative.    Genitourinary: Negative.    Musculoskeletal: Negative.    Neurological: Negative.    Psychiatric/Behavioral: Negative.     All other systems reviewed and are negative.        Physical Exam  Vitals and nursing note reviewed.   Constitutional:       Appearance: Normal appearance.   HENT:      Head: Normocephalic and atraumatic.      Nose: Nose normal.   Eyes:      Conjunctiva/sclera: Conjunctivae normal.   Cardiovascular:      Rate and Rhythm: Normal rate and regular rhythm.      Pulses: Normal pulses.      Heart sounds: Normal heart sounds.   Pulmonary:      Effort: Pulmonary effort is normal.      Breath sounds: Normal breath sounds.   Abdominal:      General: Bowel sounds are normal.      Palpations: Abdomen is soft.   Musculoskeletal:         General: Normal range of motion.      Right lower leg: No edema.      Left lower leg: No edema.   Skin:     General: Skin is warm and dry.   Neurological:      General: No focal deficit present.      Mental Status: She is alert and oriented to

## 2024-05-24 NOTE — PROGRESS NOTES
1. Have you been to the ER, urgent care clinic since your last visit?  Hospitalized since your last visit?     No      2.  Where do you normally have your labs drawn?   PCP    3. Do you need any refills today?   No    4. Which local pharmacy do you use (enter pharmacy)?   [facility]    5. Which mail order pharmacy do you use (enter pharmacy)?   No    6. Are you here for surgical clearance and if so who will be doing your     procedure/surgery (care team)?   No

## 2025-05-20 ENCOUNTER — OFFICE VISIT (OUTPATIENT)
Age: 75
End: 2025-05-20
Payer: MEDICARE

## 2025-05-20 VITALS
WEIGHT: 146 LBS | HEIGHT: 61 IN | OXYGEN SATURATION: 97 % | DIASTOLIC BLOOD PRESSURE: 73 MMHG | HEART RATE: 73 BPM | BODY MASS INDEX: 27.56 KG/M2 | SYSTOLIC BLOOD PRESSURE: 136 MMHG

## 2025-05-20 DIAGNOSIS — I50.32 CHRONIC DIASTOLIC CONGESTIVE HEART FAILURE (HCC): ICD-10-CM

## 2025-05-20 DIAGNOSIS — E78.00 PURE HYPERCHOLESTEROLEMIA: ICD-10-CM

## 2025-05-20 DIAGNOSIS — I10 ESSENTIAL (PRIMARY) HYPERTENSION: Primary | ICD-10-CM

## 2025-05-20 DIAGNOSIS — R73.03 PREDIABETES: ICD-10-CM

## 2025-05-20 PROCEDURE — 1123F ACP DISCUSS/DSCN MKR DOCD: CPT | Performed by: INTERNAL MEDICINE

## 2025-05-20 PROCEDURE — 1126F AMNT PAIN NOTED NONE PRSNT: CPT | Performed by: INTERNAL MEDICINE

## 2025-05-20 PROCEDURE — 93000 ELECTROCARDIOGRAM COMPLETE: CPT | Performed by: INTERNAL MEDICINE

## 2025-05-20 PROCEDURE — 1159F MED LIST DOCD IN RCRD: CPT | Performed by: INTERNAL MEDICINE

## 2025-05-20 PROCEDURE — 1160F RVW MEDS BY RX/DR IN RCRD: CPT | Performed by: INTERNAL MEDICINE

## 2025-05-20 PROCEDURE — 3075F SYST BP GE 130 - 139MM HG: CPT | Performed by: INTERNAL MEDICINE

## 2025-05-20 PROCEDURE — 3078F DIAST BP <80 MM HG: CPT | Performed by: INTERNAL MEDICINE

## 2025-05-20 PROCEDURE — 99214 OFFICE O/P EST MOD 30 MIN: CPT | Performed by: INTERNAL MEDICINE

## 2025-05-20 ASSESSMENT — PATIENT HEALTH QUESTIONNAIRE - PHQ9
2. FEELING DOWN, DEPRESSED OR HOPELESS: NOT AT ALL
SUM OF ALL RESPONSES TO PHQ QUESTIONS 1-9: 0
1. LITTLE INTEREST OR PLEASURE IN DOING THINGS: NOT AT ALL
SUM OF ALL RESPONSES TO PHQ QUESTIONS 1-9: 0

## 2025-05-20 ASSESSMENT — ENCOUNTER SYMPTOMS
EYES NEGATIVE: 1
GASTROINTESTINAL NEGATIVE: 1
SHORTNESS OF BREATH: 1

## 2025-05-20 NOTE — PROGRESS NOTES
1. Have you been to the ER, urgent care clinic since your last visit?  Hospitalized since your last visit?     No      2.  Where do you normally have your labs drawn?   [facility]    3. Do you need any refills today?   No    4. Which local pharmacy do you use (enter pharmacy)?   [facility]    5. Which mail order pharmacy do you use (enter pharmacy)?   No     6. Are you here for surgical clearance and if so who will be doing your     procedure/surgery (care team)?   No      
MYOCARDIAL PERFUSION 06/27/2023 11:22 AM (Final)    Interpretation Summary    Stress Combined Conclusion: Normal pharmacological myocardial perfusion study. Findings suggest a low risk.    Stress Function: Left ventricular function post-stress is normal. Post-stress ejection fraction is 79%.    Perfusion Comments: LV perfusion is normal. There is no evidence of inducible ischemia.    ECG: Resting ECG demonstrates normal sinus rhythm.    ECG: The ECG was negative for ischemia.    Stress Test: A pharmacological stress test was performed using lexiscan. Hemodynamics are adequate for diagnosis. Blood pressure demonstrated a normal response and heart rate demonstrated a normal response to stress. The patient's heart rate recovery was normal. The patient reported no symptoms during the stress test.    Signed by: Nirmal Wheeler MD on 6/27/2023 11:22 AM    ASSESSMENT & PLAN    Lab Results   Component Value Date    CHOL 159 11/19/2021    CHOL 177 08/12/2021     Lab Results   Component Value Date    TRIG 88 11/19/2021    TRIG 96 08/12/2021     Lab Results   Component Value Date    HDL 57 11/19/2021    HDL 63 08/12/2021     Lab Results   Component Value Date    LDL 86 11/19/2021    LDL 97 08/12/2021     Lab Results   Component Value Date    VLDL 16 11/19/2021    VLDL 17 08/12/2021       Pertinent laboratory and test data reviewed and discussed with patient.      Component 08/20/24 12/05/23 06/09/23 11/17/21 03/18/21 02/04/21   Cholesterol Total 177 166 161 159 181 167   Triglycerides 93 112 75 71 112 103   High Density Lipoprotein 59 71 High  66 High  62 High   62 High   63 High     Low Density Lipoprotein (calculation) 99  73  80 83  97  83    Cholesterol/HDL Ratio 3 2.3 2.4 2.6 2.9 2.7       Nonspecific ST-T changes: 6/16 nl stress test  abnormal stress test and ant t inversion in past-2007 11/20 Dyspnea on exertion is chronic and likely due to deconditioning.  Recommended that patient exercise regularly and report

## 2025-06-17 ENCOUNTER — HOSPITAL ENCOUNTER (OUTPATIENT)
Facility: HOSPITAL | Age: 75
Discharge: HOME | End: 2025-06-20
Attending: INTERNAL MEDICINE

## 2025-06-17 DIAGNOSIS — E78.00 PURE HYPERCHOLESTEROLEMIA: ICD-10-CM

## 2025-06-17 DIAGNOSIS — R73.03 PREDIABETES: ICD-10-CM

## 2025-06-17 DIAGNOSIS — I10 ESSENTIAL (PRIMARY) HYPERTENSION: ICD-10-CM

## 2025-06-17 PROCEDURE — 75571 CT HRT W/O DYE W/CA TEST: CPT

## 2025-06-23 ENCOUNTER — RESULTS FOLLOW-UP (OUTPATIENT)
Age: 75
End: 2025-06-23

## 2025-06-23 NOTE — TELEPHONE ENCOUNTER
----- Message from Dr. Wander Reddy MD sent at 6/21/2025  8:54 PM EDT -----  Recommend aspirin 81mg po daily in addition to statin, needs to followup to Discuss results with Dr. Wheeler    Thanks  TV  ----- Message -----  From: Solomon Dunaway Incoming Orders Results To [facility]  Sent: 6/21/2025   6:16 PM EDT  To: iNrmal Wheeler MD

## 2025-06-24 NOTE — TELEPHONE ENCOUNTER
----- Message from Dr. Nirmal Wheeler MD sent at 6/24/2025  8:55 AM EDT -----  Make it 6 months follow-up from last visit and tell her to call if there is any significant chest pain or shortness of breath  ----- Message -----  From: Mae Zhao LPN  Sent: 6/23/2025   3:35 PM EDT  To: Nirmal Wheeler MD

## 2025-06-24 NOTE — TELEPHONE ENCOUNTER
Spoke to patient per Dr. Wheeler. Make it 6 months follow-up from last visit and tell her to call if there is any significant chest pain or shortness of breath. She voices understanding and acceptance of this advice and will call back if any further questions or concerns.

## 2025-08-14 ENCOUNTER — OFFICE VISIT (OUTPATIENT)
Age: 75
End: 2025-08-14
Payer: MEDICARE

## 2025-08-14 VITALS
BODY MASS INDEX: 28.05 KG/M2 | HEIGHT: 61 IN | HEART RATE: 61 BPM | DIASTOLIC BLOOD PRESSURE: 57 MMHG | WEIGHT: 148.6 LBS | SYSTOLIC BLOOD PRESSURE: 138 MMHG

## 2025-08-14 DIAGNOSIS — I25.10 CORONARY ARTERY DISEASE DUE TO CALCIFIED CORONARY LESION: Primary | ICD-10-CM

## 2025-08-14 DIAGNOSIS — E78.00 PURE HYPERCHOLESTEROLEMIA: ICD-10-CM

## 2025-08-14 DIAGNOSIS — I25.84 CORONARY ARTERY DISEASE DUE TO CALCIFIED CORONARY LESION: Primary | ICD-10-CM

## 2025-08-14 DIAGNOSIS — R73.03 PREDIABETES: ICD-10-CM

## 2025-08-14 DIAGNOSIS — I10 ESSENTIAL (PRIMARY) HYPERTENSION: ICD-10-CM

## 2025-08-14 DIAGNOSIS — I50.32 CHRONIC DIASTOLIC CONGESTIVE HEART FAILURE (HCC): ICD-10-CM

## 2025-08-14 PROCEDURE — 1159F MED LIST DOCD IN RCRD: CPT | Performed by: INTERNAL MEDICINE

## 2025-08-14 PROCEDURE — 3078F DIAST BP <80 MM HG: CPT | Performed by: INTERNAL MEDICINE

## 2025-08-14 PROCEDURE — 1160F RVW MEDS BY RX/DR IN RCRD: CPT | Performed by: INTERNAL MEDICINE

## 2025-08-14 PROCEDURE — 99214 OFFICE O/P EST MOD 30 MIN: CPT | Performed by: INTERNAL MEDICINE

## 2025-08-14 PROCEDURE — 1123F ACP DISCUSS/DSCN MKR DOCD: CPT | Performed by: INTERNAL MEDICINE

## 2025-08-14 PROCEDURE — 3074F SYST BP LT 130 MM HG: CPT | Performed by: INTERNAL MEDICINE

## 2025-08-14 PROCEDURE — 1125F AMNT PAIN NOTED PAIN PRSNT: CPT | Performed by: INTERNAL MEDICINE

## 2025-08-14 RX ORDER — MAGNESIUM OXIDE TAB 400 MG (241.3 MG ELEMENTAL MG) 400 (241.3 MG) MG
400 TAB ORAL EVERY OTHER DAY
COMMUNITY
Start: 2025-08-09

## 2025-08-14 RX ORDER — ATORVASTATIN CALCIUM 80 MG/1
80 TABLET, FILM COATED ORAL DAILY
Qty: 90 TABLET | Refills: 3 | Status: SHIPPED | OUTPATIENT
Start: 2025-08-14

## 2025-08-14 RX ORDER — ISOSORBIDE MONONITRATE 30 MG/1
30 TABLET, EXTENDED RELEASE ORAL DAILY
COMMUNITY
Start: 2025-08-01 | End: 2025-08-14 | Stop reason: ALTCHOICE

## 2025-08-14 ASSESSMENT — ENCOUNTER SYMPTOMS
GASTROINTESTINAL NEGATIVE: 1
EYES NEGATIVE: 1
SHORTNESS OF BREATH: 1